# Patient Record
Sex: MALE | Race: WHITE
[De-identification: names, ages, dates, MRNs, and addresses within clinical notes are randomized per-mention and may not be internally consistent; named-entity substitution may affect disease eponyms.]

---

## 2017-01-12 ENCOUNTER — HOSPITAL ENCOUNTER (INPATIENT)
Dept: HOSPITAL 36 - ER | Age: 47
LOS: 6 days | Discharge: NURSING, REHAB OR LONG-TERM CARE | DRG: 871 | End: 2017-01-18
Attending: INTERNAL MEDICINE | Admitting: INTERNAL MEDICINE
Payer: MEDICARE

## 2017-01-12 DIAGNOSIS — N39.0: ICD-10-CM

## 2017-01-12 DIAGNOSIS — A41.9: Primary | ICD-10-CM

## 2017-01-12 DIAGNOSIS — Z88.8: ICD-10-CM

## 2017-01-12 DIAGNOSIS — F79: ICD-10-CM

## 2017-01-12 DIAGNOSIS — M81.0: ICD-10-CM

## 2017-01-12 DIAGNOSIS — Z98.890: ICD-10-CM

## 2017-01-12 DIAGNOSIS — G80.9: ICD-10-CM

## 2017-01-12 DIAGNOSIS — I10: ICD-10-CM

## 2017-01-12 DIAGNOSIS — J18.9: ICD-10-CM

## 2017-01-12 DIAGNOSIS — G40.909: ICD-10-CM

## 2017-01-12 PROCEDURE — Z7610: HCPCS

## 2017-01-12 NOTE — ED PHYSICIAN CHART
Chief Complaint/HPI





- Patient Information


Date Seen:: 01/12/17


Time Seen:: 23:30


Chief Complaint:: fever


History of Present Illness:: 





had temperature of 102.5 at 2100.  Arrived at  2 hr before from [place] 

where had extra corporeal shock wave lithotripsy and urological surgery with 

right flank incision.


Allergies:: 


 Allergies











Allergy/AdvReac Type Severity Reaction Status Date / Time


 


MDX Docusate [From Dss] Allergy   Verified 10/10/15 18:41











Historian:: EMS, Family Member


Review:: Nurse's Note Reviewed, Transfer documents Reviewed





Review of Systems





- Review of Systems


General/Constitutional: Fever


Skin: No skin lesions


Head: No headache


Eyes: No loss of vision


ENT: No earache


Neck: No neck pain


Cardio Vascular: No chest pain


Pulmonary: No SOB


GI: No nausea, No vomiting


G/U: No dysuria


Musculoskeletal: No bone or joint pain


Endocrine: No polyuria


Psychiatric: No prior psych history


Hematopoietic: No bruising


Allergic/Immuno: No urticaria


Neurological: No syncope, No focal symptoms





Past Medical History





- Past Medical History


Past Medical History: HTN (profound MR; cerebral palsy; blind; osteoporosis; 

nephrolithiasis; mitral valve regurgitation; recurrent UTI; gastritis), Seizures


Family History: Other (not available)


Social History: Care Facility


Surgical History: other (unavailable)





Family Medical History





- Family Member


  ** Mother


History Unknown: Yes





Physical Exam





- Physical Examination


Other Gen/Cons comments:: 





non-verbal; contractured


Head: Atraumatic


Eyes: Lids, conjuctiva normal, PERRL


Skin: Nl inspection


ENMT: External ears, nose nl


Neck: No JVD


Respiratory: Nl effort/Exclusion, Clear to Auscultation


Cardio Vascular: RRR


GI: No tenderness/rebounding/guarding, No organomegaly, No hernia, Normal BS's


Extremities: No edema





Labs/Radiology/EKG Results





- Lab Results


Results: 





 Laboratory Results - last 24 hr











  01/13/17 01/13/17 01/13/17





  00:04 00:04 01:00


 


WBC  12.9 H  


 


RBC  4.28 L  


 


Hgb  11.6 L  


 


Hct  34.8 L D  


 


MCV  81.3  


 


MCH  27.0  


 


MCHC Differential  33.2  


 


RDW  14.7  


 


Plt Count  400  D  


 


MPV  6.7  


 


Neutrophils %  76.7  


 


Lymphocytes %  13.8 L  


 


Monocytes %  6.1  


 


Eosinophils %  3.3  


 


Basophils %  0.1  


 


Sodium   136 


 


Potassium   3.4 L 


 


Chloride   104 


 


Carbon Dioxide   22.2 


 


Anion Gap   13.2 


 


BUN   18 


 


Creatinine   1.5 H 


 


Est GFR ( Amer)   > 60.0 


 


Est GFR (Non-Af Amer)   53.3 


 


BUN/Creatinine Ratio   12.0 


 


Glucose   141 H 


 


Calcium   9.6 


 


Urine Source    VIDES PORT


 


Urine Color    YELLOW


 


Urine Clarity    HAZY


 


Urine pH    6.0


 


Ur Specific Gravity    1.020


 


Urine Protein    >300 H


 


Urine Glucose (UA)    NEGATIVE


 


Urine Ketones    NEGATIVE


 


Urine Blood    LARGE H


 


Urine Nitrate    NEGATIVE


 


Urine Bilirubin    NEGATIVE


 


Urine Urobilinogen    0.2


 


Ur Leukocyte Esterase    SMALL H


 


Urine RBC    >100 H


 


Urine WBC    >100 H


 


Ur Epithelial Cells    FEW


 


Urine Bacteria    MODERATE














- Radiology Results


Results: 





CXR: pleural effusion right base.





ED Septic Shock





- .


Is Septic Shock (SBP<90, OR Lactate>4 mmol\L) present?: No





Reassessment (Disposition)





- Reassessment


Reassessment Condition:: Unchanged





- Diagnosis


Diagnosis:: 





sepsis; acute febrile illness; urinary tract infection





- Patient Disposition


Admitted to:: Med/Surg


Spoke to:: Ramone Elias


Admitting Medical Physician:: Ramone Elias


Condition at Disposition:: Stable, Improved

## 2017-01-13 LAB
ANION GAP SERPL CALC-SCNC: 13.2 MMOL/L (ref 7–16)
BACTERIA #/AREA URNS HPF: (no result) /HPF
BASOPHILS NFR BLD AUTO: 0.1 % (ref 0–2)
BILIRUB UR-MCNC: NEGATIVE MG/DL
BUN SERPL-MCNC: 18 MG/DL (ref 7–25)
BUN/CREAT SERPL: 12
CALCIUM SERPL-MCNC: 9.6 MG/DL (ref 8.6–10.3)
CHLORIDE SERPL-SCNC: 104 MEQ/L (ref 98–107)
CO2 SERPL-SCNC: 22.2 MEQ/L (ref 21–31)
COLOR UR: YELLOW
CREAT SERPL-MCNC: 1.5 MG/DL (ref 0.7–1.3)
EOSINOPHIL NFR BLD AUTO: 3.3 % (ref 0–5)
EPI CELLS URNS QL MICRO: (no result) /LPF
ERYTHROCYTE [DISTWIDTH] IN BLOOD BY AUTOMATED COUNT: 14.7 % (ref 11.5–20)
GLUCOSE SERPL-MCNC: 141 MG/DL (ref 70–105)
GLUCOSE UR STRIP-MCNC: NEGATIVE MG/DL
HCT VFR BLD CALC: 34.8 % (ref 39–49)
HGB BLD-MCNC: 11.6 GM/DL (ref 13.2–17.3)
KETONES UR STRIP-MCNC: NEGATIVE MG/DL
LYMPHOCYTES NFR BLD AUTO: 13.8 % (ref 20–50)
MCH RBC QN AUTO: 27 PG (ref 26–30)
MCHC RBC AUTO-ENTMCNC: 33.2 PG (ref 28–36)
MCV RBC AUTO: 81.3 FL (ref 80–99)
MONOCYTES NFR BLD AUTO: 6.1 % (ref 2–10)
NEUTROPHILS # BLD: 9.9 TH/CMM (ref 1.8–8)
NEUTROPHILS NFR BLD AUTO: 76.7 % (ref 40–80)
PH UR STRIP: 6 [PH]
PLATELET # BLD: 400 TH/CMM (ref 150–400)
PMV BLD AUTO: 6.7 FL
POTASSIUM SERPL-SCNC: 3.4 MEQ/L (ref 3.5–5.1)
PROT UR STRIP-MCNC: >300 MG/DL
RBC # BLD AUTO: 4.28 MIL/CMM (ref 4.3–5.7)
RBC # UR STRIP: (no result) /UL
RBC #/AREA URNS HPF: >100 /HPF (ref 0–5)
SODIUM SERPL-SCNC: 136 MEQ/L (ref 136–145)
UROBILINOGEN UR STRIP-ACNC: 0.2 E.U./DL (ref 0.2–1)
WBC # BLD AUTO: 12.9 TH/CMM (ref 4.8–10.8)
WBC #/AREA URNS HPF: >100 /HPF (ref 0–5)

## 2017-01-13 RX ADMIN — ZINC OXIDE SCH APPL: 200 OINTMENT TOPICAL at 09:58

## 2017-01-13 RX ADMIN — Medication SCH TAB: at 09:06

## 2017-01-13 RX ADMIN — DEXTROSE AND SODIUM CHLORIDE SCH MLS/HR: 5; .45 INJECTION, SOLUTION INTRAVENOUS at 03:00

## 2017-01-13 RX ADMIN — POLYETHYLENE GLYCOL 3350 SCH GM: 17 POWDER, FOR SOLUTION ORAL at 16:56

## 2017-01-13 RX ADMIN — ZINC OXIDE SCH APPL: 200 OINTMENT TOPICAL at 16:55

## 2017-01-13 RX ADMIN — POLYETHYLENE GLYCOL 3350 SCH GM: 17 POWDER, FOR SOLUTION ORAL at 09:08

## 2017-01-13 RX ADMIN — DEXTROSE AND SODIUM CHLORIDE SCH MLS/HR: 5; .45 INJECTION, SOLUTION INTRAVENOUS at 14:52

## 2017-01-13 NOTE — DIAGNOSTIC IMAGING REPORT
CHEST X-RAY: AP view



INDICATION: Fever



COMPARISON: Chest x-ray 10/10/2015



FINDINGS: A right pleural effusion is noted with right lung infiltrates.

Heart size is normal. Osseous structures are intact.



IMPRESSION:



Interval development of a right pleural effusion with right lung

infiltrates. Clinical correlation and follow-up is recommended.

## 2017-01-13 NOTE — ADMIT CRITERIA FORM
Admit Criteria Forms





- Admit Criteria


Diagnosis: 





                                  URINARY COMPLICATIONS





Clinical Indications for Inpatient Care 


                                                                    (Place 'X' 

for any and all applicable criteria):





Ongoing inpatient care may be indicated for urinary complications with ANY ONE 

of the following: 


[ X]I.    Urinary tract infection requiring inpatient care as indicated by ANY 

ONE of the following(8)(19)(20):


         [ ]a)  Severe symptoms (eg, high fever, severe pain)


         [ ]b)  Vomiting or dehydration requiring ongoing inpatient care


         [ X]c)  IV antibiotic needs that cannot be managed at lower level of 

care 


         [ ]d)  Hemodynamic instability


         [ ]e)  Obstruction of collecting system by stone or tumor


[ ]II.   Urinary retention requiring drainage or surgery (3)(4)(5)(17)(18)


[ ]III.  Renal failure (Use Renal Failure  Criteria  for further information.)


[ ]IV. Oliguria(30)


[ ]V.  Post obstructive diuresis requiring close monitoring of urine output and 

intravenous compensation for excessive fluid losses(33)











Extended stay beyond goal length of stay for primary condition may be needed 

until ALL of the following are present(3)(4)(5)(8):


[ ]a)   Renal function (creatinine) at baseline, or daily decreases in 

creatinine consistent with renal function return


[ ]b)   Voiding adequately or with urinary catheter or percutaneous suprapubic 

tube and management regimen in place 


         that is performable at lower level of care.


[ ]c)   Urine output adequate


[ ]d)   Fever absent or resolving


[ ]e)   Infection absent or treatable at next level of care











The original [employer] content created by [employer] has been revised. 


The portions of the content which have been revised are identified through the 

use of italic text or in bold, and [facility] 


has neither reviewed nor approved the modified material. All other unmodified 

content is copyright  [facility]


     


Please see references footnoted in the original [facility] [employer] edition 

2016


Admit Criteria Met?: Yes

## 2017-01-13 NOTE — INTERNAL MEDICINE PROG NOTE
Internal Medicine Subjective





- Subjective


Service Date: 01/13/17 ([facility] [id number])





Internal Medicine Objective





- Results


Result Diagrams: 


 01/13/17 00:04





 01/13/17 00:04


Recent Labs: 


 Laboratory Last Values











WBC  12.9 Th/cmm (4.8-10.8)  H  01/13/17  00:04    


 


RBC  4.28 Mil/cmm (4.30-5.70)  L  01/13/17  00:04    


 


Hgb  11.6 gm/dL (13.2-17.3)  L  01/13/17  00:04    


 


Hct  34.8 % (39.0-49.0)  L D 01/13/17  00:04    


 


MCV  81.3 fl (80-99)   01/13/17  00:04    


 


MCH  27.0 pg (26.0-30.0)   01/13/17  00:04    


 


MCHC Differential  33.2 pg (28.0-36.0)   01/13/17  00:04    


 


RDW  14.7 % (11.5-20.0)   01/13/17  00:04    


 


Plt Count  400 Th/cmm (150-400)  D 01/13/17  00:04    


 


MPV  6.7 fl  01/13/17  00:04    


 


Neutrophils %  76.7 % (40.0-80.0)   01/13/17  00:04    


 


Lymphocytes %  13.8 % (20.0-50.0)  L  01/13/17  00:04    


 


Monocytes %  6.1 % (2.0-10.0)   01/13/17  00:04    


 


Eosinophils %  3.3 % (0.0-5.0)   01/13/17  00:04    


 


Basophils %  0.1 % (0.0-2.0)   01/13/17  00:04    


 


Sodium  136 mEq/L (136-145)   01/13/17  00:04    


 


Potassium  3.4 mEq/L (3.5-5.1)  L  01/13/17  00:04    


 


Chloride  104 mEq/L ()   01/13/17  00:04    


 


Carbon Dioxide  22.2 mEq/L (21.0-31.0)   01/13/17  00:04    


 


Anion Gap  13.2  (7.0-16.0)   01/13/17  00:04    


 


BUN  18 mg/dL (7-25)   01/13/17  00:04    


 


Creatinine  1.5 mg/dL (0.7-1.3)  H  01/13/17  00:04    


 


Est GFR ( Amer)  > 60.0 ml/min  01/13/17  00:04    


 


Est GFR (Non-Af Amer)  53.3 ml/min  01/13/17  00:04    


 


BUN/Creatinine Ratio  12.0   01/13/17  00:04    


 


Glucose  141 mg/dL ()  H  01/13/17  00:04    


 


Calcium  9.6 mg/dL (8.6-10.3)   01/13/17  00:04    


 


Urine Source  VIDES PORT   01/13/17  01:00    


 


Urine Color  YELLOW   01/13/17  01:00    


 


Urine Clarity  HAZY  (CLEAR)   01/13/17  01:00    


 


Urine pH  6.0   01/13/17  01:00    


 


Ur Specific Gravity  1.020  (1.005-1.030)   01/13/17  01:00    


 


Urine Protein  >300 mg/dL (NEGATIVE)  H  01/13/17  01:00    


 


Urine Glucose (UA)  NEGATIVE mg/dL (NEGATIVE)   01/13/17  01:00    


 


Urine Ketones  NEGATIVE mg/dL (NEGATIVE)   01/13/17  01:00    


 


Urine Blood  LARGE  (NEGATIVE)  H  01/13/17  01:00    


 


Urine Nitrate  NEGATIVE  (NEGATIVE)   01/13/17  01:00    


 


Urine Bilirubin  NEGATIVE  (NEGATIVE)   01/13/17  01:00    


 


Urine Urobilinogen  0.2 E.U./dL (0.2 - 1.0)   01/13/17  01:00    


 


Ur Leukocyte Esterase  SMALL  (NEGATIVE)  H  01/13/17  01:00    


 


Urine RBC  >100 /hpf (0-5)  H  01/13/17  01:00    


 


Urine WBC  >100 /hpf (0-5)  H  01/13/17  01:00    


 


Ur Epithelial Cells  FEW /lpf (FEW)   01/13/17  01:00    


 


Urine Bacteria  MODERATE /hpf (NONE SEEN)   01/13/17  01:00    














- Physical Exam


Vitals and I&O: 


 Vital Signs











Temp  99.4 F   01/13/17 08:00


 


Pulse  77   01/13/17 09:07


 


Resp  18   01/13/17 08:00


 


BP  154/79   01/13/17 09:07


 


Pulse Ox  95   01/13/17 04:58








 Intake & Output











 01/12/17 01/13/17 01/13/17





 18:59 06:59 18:59


 


Intake Total  0 


 


Output Total  450 


 


Balance  -450 


 


Intake:   


 


  Oral  0 


 


Output:   


 


  Urine  450 


 


Other:   


 


  # Bowel Movements  0 











Active Medications: 


 Current Medications





Acetaminophen (Tylenol 650mg Supp)  650 mg RC Q4HR PRN


   PRN Reason: Pain or Fever >101


   Stop: 03/14/17 00:18


Acetaminophen/Hydrocodone Bitart (Norco 5mg/325mg)  1 tab PO Q4H PRN


   PRN Reason: Pain (Moderate)


   Stop: 03/14/17 00:18


Al Hydrox/Mg Hydrox/Simethicone (Maalox)  30 ml PO Q6HR PRN


   PRN Reason: Constipation


   Stop: 03/14/17 00:21


Amlodipine Besylate (Norvasc)  5 mg PO DAILY [facility]


   Stop: 03/14/17 08:59


   Last Admin: 01/13/17 09:07 Dose:  5 mg


Baclofen (Lioresal)  10 mg PO QID [facility]


   Stop: 03/14/17 08:59


   Last Admin: 01/13/17 12:12 Dose:  10 mg


Bisacodyl (Dulcolax 10 Mg Supp)  10 mg RC DAILY PRN


   PRN Reason: Constipation


   Stop: 03/14/17 00:18


Calcium Carbonate (Os-Isai)  500 mg PO TID [facility]


   Stop: 03/14/17 08:59


   Last Admin: 01/13/17 09:07 Dose:  500 mg


Famotidine (Pepcid)  20 mg PO BID [facility]


   Stop: 03/14/17 08:59


   Last Admin: 01/13/17 09:06 Dose:  20 mg


Dextrose/Sodium Chloride (D5-0.45ns)  1,000 mls @ 80 mls/hr IV .[id number] [facility]


   Stop: 03/14/17 00:29


   Last Admin: 01/13/17 03:00 Dose:  80 mls/hr


Piperacillin Sod/Tazobactam (Sod 4.5 gm/ Sodium Chloride)  100 mls @ 100 mls/hr 

IV Q8HR [facility]


   Stop: 03/14/17 01:59


   Last Admin: 01/13/17 03:25 Dose:  100 mls/hr


Magnesium Hydroxide (Milk Of Magnesia)  30 ml PO Q72H PRN


   PRN Reason: Constipation


   Stop: 03/14/17 00:18


Metoprolol Tartrate (Lopressor)  50 mg PO BID [facility]


   Stop: 03/14/17 08:59


   Last Admin: 01/13/17 09:06 Dose:  50 mg


Ondansetron HCl (Zofran)  4 mg IV Q8H PRN


   PRN Reason: Nausea / Vomiting


   Stop: 03/14/17 00:21


Petrolatum (Zinc Oxide)  1 appl TP BID [facility]


   Stop: 03/14/17 08:59


   Last Admin: 01/13/17 09:58 Dose:  1 appl


Polyethylene Glycol (Miralax)  17 gm PO BID [facility]


   Stop: 03/14/17 08:59


   Last Admin: 01/13/17 09:08 Dose:  17 gm


Vitamin D (Vitamin D)  400 iu PO DAILY [facility]


   Stop: 03/14/17 08:59


   Last Admin: 01/13/17 09:06 Dose:  400 iu











- Procedures


Procedures: 


 Procedures











Procedure Code Date


 


INSERT INDWELLING CATH 57.94 10/16/08


 


INSERT TEMP BLADDER CATH [id number] 10/16/08














Internal Medicine Assmt/Plan





- Assessment


Assessment: 





Fever, Sepsis

## 2017-01-14 RX ADMIN — DEXTROSE AND SODIUM CHLORIDE SCH MLS/HR: 5; .45 INJECTION, SOLUTION INTRAVENOUS at 06:40

## 2017-01-14 RX ADMIN — DEXTROSE AND SODIUM CHLORIDE SCH MLS/HR: 5; .45 INJECTION, SOLUTION INTRAVENOUS at 20:12

## 2017-01-14 RX ADMIN — POLYETHYLENE GLYCOL 3350 SCH GM: 17 POWDER, FOR SOLUTION ORAL at 08:49

## 2017-01-14 RX ADMIN — DEXTROMETHORPHAN HBR AND GUAIFENESIN PRN ML: 10; 100 SOLUTION ORAL at 19:56

## 2017-01-14 RX ADMIN — Medication SCH TAB: at 08:49

## 2017-01-14 RX ADMIN — ZINC OXIDE SCH APPL: 200 OINTMENT TOPICAL at 16:24

## 2017-01-14 RX ADMIN — ZINC OXIDE SCH APPL: 200 OINTMENT TOPICAL at 08:51

## 2017-01-14 RX ADMIN — POLYETHYLENE GLYCOL 3350 SCH GM: 17 POWDER, FOR SOLUTION ORAL at 16:24

## 2017-01-14 NOTE — INTERNAL MEDICINE PROG NOTE
Internal Medicine Subjective





- Subjective


Service Date: 01/14/17


Patient seen and examined:: with staff


Patient is:: awake


Per staff patient is:: no adverse event





Internal Medicine Objective





- Results


Result Diagrams: 


 01/13/17 00:04





 01/13/17 00:04


Recent Labs: 


 Laboratory Last Values











WBC  12.9 Th/cmm (4.8-10.8)  H  01/13/17  00:04    


 


RBC  4.28 Mil/cmm (4.30-5.70)  L  01/13/17  00:04    


 


Hgb  11.6 gm/dL (13.2-17.3)  L  01/13/17  00:04    


 


Hct  34.8 % (39.0-49.0)  L D 01/13/17  00:04    


 


MCV  81.3 fl (80-99)   01/13/17  00:04    


 


MCH  27.0 pg (26.0-30.0)   01/13/17  00:04    


 


MCHC Differential  33.2 pg (28.0-36.0)   01/13/17  00:04    


 


RDW  14.7 % (11.5-20.0)   01/13/17  00:04    


 


Plt Count  400 Th/cmm (150-400)  D 01/13/17  00:04    


 


MPV  6.7 fl  01/13/17  00:04    


 


Neutrophils %  76.7 % (40.0-80.0)   01/13/17  00:04    


 


Lymphocytes %  13.8 % (20.0-50.0)  L  01/13/17  00:04    


 


Monocytes %  6.1 % (2.0-10.0)   01/13/17  00:04    


 


Eosinophils %  3.3 % (0.0-5.0)   01/13/17  00:04    


 


Basophils %  0.1 % (0.0-2.0)   01/13/17  00:04    


 


Sodium  136 mEq/L (136-145)   01/13/17  00:04    


 


Potassium  3.4 mEq/L (3.5-5.1)  L  01/13/17  00:04    


 


Chloride  104 mEq/L ()   01/13/17  00:04    


 


Carbon Dioxide  22.2 mEq/L (21.0-31.0)   01/13/17  00:04    


 


Anion Gap  13.2  (7.0-16.0)   01/13/17  00:04    


 


BUN  18 mg/dL (7-25)   01/13/17  00:04    


 


Creatinine  1.5 mg/dL (0.7-1.3)  H  01/13/17  00:04    


 


Est GFR ( Amer)  > 60.0 ml/min  01/13/17  00:04    


 


Est GFR (Non-Af Amer)  53.3 ml/min  01/13/17  00:04    


 


BUN/Creatinine Ratio  12.0   01/13/17  00:04    


 


Glucose  141 mg/dL ()  H  01/13/17  00:04    


 


Calcium  9.6 mg/dL (8.6-10.3)   01/13/17  00:04    


 


Urine Source  VIDES PORT   01/13/17  01:00    


 


Urine Color  YELLOW   01/13/17  01:00    


 


Urine Clarity  HAZY  (CLEAR)   01/13/17  01:00    


 


Urine pH  6.0   01/13/17  01:00    


 


Ur Specific Gravity  1.020  (1.005-1.030)   01/13/17  01:00    


 


Urine Protein  >300 mg/dL (NEGATIVE)  H  01/13/17  01:00    


 


Urine Glucose (UA)  NEGATIVE mg/dL (NEGATIVE)   01/13/17  01:00    


 


Urine Ketones  NEGATIVE mg/dL (NEGATIVE)   01/13/17  01:00    


 


Urine Blood  LARGE  (NEGATIVE)  H  01/13/17  01:00    


 


Urine Nitrate  NEGATIVE  (NEGATIVE)   01/13/17  01:00    


 


Urine Bilirubin  NEGATIVE  (NEGATIVE)   01/13/17  01:00    


 


Urine Urobilinogen  0.2 E.U./dL (0.2 - 1.0)   01/13/17  01:00    


 


Ur Leukocyte Esterase  SMALL  (NEGATIVE)  H  01/13/17  01:00    


 


Urine RBC  >100 /hpf (0-5)  H  01/13/17  01:00    


 


Urine WBC  >100 /hpf (0-5)  H  01/13/17  01:00    


 


Ur Epithelial Cells  FEW /lpf (FEW)   01/13/17  01:00    


 


Urine Bacteria  MODERATE /hpf (NONE SEEN)   01/13/17  01:00    














- Physical Exam


Vitals and I&O: 


 Vital Signs











Temp  99.1 F   01/14/17 16:00


 


Pulse  99   01/14/17 16:25


 


Resp  18   01/14/17 16:00


 


BP  128/76   01/14/17 16:25


 


Pulse Ox  94   01/14/17 16:00








 Intake & Output











 01/13/17 01/14/17 01/14/17





 18:59 06:59 18:59


 


Intake Total 8216.948 9274.667 


 


Output Total  1201 


 


Balance 1049.333 10.667 


 


Intake:   


 


  Intake, IV Amount 1760.746 4002.667 


 


    D5-0.45NS 1,000 ml @ 80 624.794 4175 





    mls/hr IV .[id number] [facility] Rx   





    #:[id number]   


 


    Piperacillin Sodium/ 100 191.667 





    Tazobact 4.5 gm In Sodium   





    Chloride 0.9% 100 ml @   





    100 mls/hr IV Q8HR [facility] Rx   





    #:[id number]   


 


  Oral  20 


 


Output:   


 


  Urine  1200 


 


  Stool  1 











Active Medications: 


 Current Medications





Acetaminophen (Tylenol 650mg Supp)  650 mg RC Q4HR PRN


   PRN Reason: Pain or Fever >101


   Stop: 03/14/17 00:18


   Last Admin: 01/14/17 08:49 Dose:  650 mg


Acetaminophen/Hydrocodone Bitart (Norco 5mg/325mg)  1 tab PO Q4H PRN


   PRN Reason: Pain (Moderate)


   Stop: 03/14/17 00:18


Al Hydrox/Mg Hydrox/Simethicone (Maalox)  30 ml PO Q6HR PRN


   PRN Reason: Constipation


   Stop: 03/14/17 00:21


Amlodipine Besylate (Norvasc)  5 mg PO DAILY [facility]


   Stop: 03/14/17 08:59


   Last Admin: 01/14/17 08:50 Dose:  5 mg


Baclofen (Lioresal)  10 mg PO QID [facility]


   Stop: 03/14/17 08:59


   Last Admin: 01/14/17 16:25 Dose:  10 mg


Bisacodyl (Dulcolax 10 Mg Supp)  10 mg RC DAILY PRN


   PRN Reason: Constipation


   Stop: 03/14/17 00:18


Calcium Carbonate (Os-Isai)  500 mg PO TID [facility]


   Stop: 03/14/17 08:59


   Last Admin: 01/14/17 13:48 Dose:  500 mg


Famotidine (Pepcid)  20 mg PO BID [facility]


   Stop: 03/14/17 08:59


   Last Admin: 01/14/17 16:25 Dose:  20 mg


Dextrose/Sodium Chloride (D5-0.45ns)  1,000 mls @ 80 mls/hr IV .[id number] [facility]


   Stop: 03/14/17 00:29


   Last Admin: 01/14/17 06:40 Dose:  80 mls/hr


Piperacillin Sod/Tazobactam (Sod 4.5 gm/ Sodium Chloride)  100 mls @ 100 mls/hr 

IV Q8HR [facility]


   Stop: 03/14/17 01:59


   Last Infusion: 01/14/17 06:18 Dose:  0 mls/hr


Magnesium Hydroxide (Milk Of Magnesia)  30 ml PO Q72H PRN


   PRN Reason: Constipation


   Stop: 03/14/17 00:18


Metoprolol Tartrate (Lopressor)  50 mg PO BID [facility]


   Stop: 03/14/17 08:59


   Last Admin: 01/14/17 16:25 Dose:  50 mg


Miscellaneous ([employer] Prophylaxis Screen/ Admission)  1 ea [facility] PRN PRN


   PRN Reason: PROTOCOL


   Stop: 03/14/17 13:08


Ondansetron HCl (Zofran)  4 mg IV Q8H PRN


   PRN Reason: Nausea / Vomiting


   Stop: 03/14/17 00:21


Petrolatum (Zinc Oxide)  1 appl TP BID [facility]


   Stop: 03/14/17 08:59


   Last Admin: 01/14/17 16:24 Dose:  1 appl


Polyethylene Glycol (Miralax)  17 gm PO BID [facility]


   Stop: 03/14/17 08:59


   Last Admin: 01/14/17 16:24 Dose:  17 gm


Vitamin D (Vitamin D)  400 iu PO DAILY [facility]


   Stop: 03/14/17 08:59


   Last Admin: 01/14/17 08:49 Dose:  400 iu








General: alert


HEENT: NC/AT, PERRLA


Neck: Supple


Lungs: CTAB


Cardiovascular: RRR, Normal S1, Normal S2, without murmur


Abdomen: soft non-tender





- Procedures


Procedures: 


 Procedures











Procedure Code Date


 


INSERT INDWELLING CATH 57.94 10/16/08


 


INSERT TEMP BLADDER CATH [id number] 10/16/08














Internal Medicine Assmt/Plan





- Assessment


Assessment: 





Fever


Sepsis





- Plan


Plan: 





MONITOR FOR FEVER


IVABX


CBC/BMP IN AM


IVF FOR HYDRATION

## 2017-01-15 LAB
ANION GAP SERPL CALC-SCNC: 10.1 MMOL/L (ref 7–16)
BASOPHILS NFR BLD AUTO: 0.8 % (ref 0–2)
BUN SERPL-MCNC: 12 MG/DL (ref 7–25)
BUN/CREAT SERPL: 7.5
CALCIUM SERPL-MCNC: 9.1 MG/DL (ref 8.6–10.3)
CHLORIDE SERPL-SCNC: 108 MEQ/L (ref 98–107)
CO2 SERPL-SCNC: 20.6 MEQ/L (ref 21–31)
CREAT SERPL-MCNC: 1.6 MG/DL (ref 0.7–1.3)
EOSINOPHIL NFR BLD AUTO: 5.6 % (ref 0–5)
ERYTHROCYTE [DISTWIDTH] IN BLOOD BY AUTOMATED COUNT: 15 % (ref 11.5–20)
GLUCOSE SERPL-MCNC: 107 MG/DL (ref 70–105)
HCT VFR BLD CALC: 29.5 % (ref 39–49)
HGB BLD-MCNC: 9.8 GM/DL (ref 13.2–17.3)
LYMPHOCYTES NFR BLD AUTO: 13 % (ref 20–50)
MCH RBC QN AUTO: 26.9 PG (ref 26–30)
MCHC RBC AUTO-ENTMCNC: 33.2 PG (ref 28–36)
MCV RBC AUTO: 81.2 FL (ref 80–99)
MONOCYTES NFR BLD AUTO: 7.5 % (ref 2–10)
NEUTROPHILS # BLD: 7.1 TH/CMM (ref 1.8–8)
NEUTROPHILS NFR BLD AUTO: 73.1 % (ref 40–80)
PLATELET # BLD: 420 TH/CMM (ref 150–400)
PMV BLD AUTO: 6.8 FL
POTASSIUM SERPL-SCNC: 3.7 MEQ/L (ref 3.5–5.1)
RBC # BLD AUTO: 3.63 MIL/CMM (ref 4.3–5.7)
SODIUM SERPL-SCNC: 135 MEQ/L (ref 136–145)
WBC # BLD AUTO: 9.6 TH/CMM (ref 4.8–10.8)

## 2017-01-15 RX ADMIN — POLYETHYLENE GLYCOL 3350 SCH GM: 17 POWDER, FOR SOLUTION ORAL at 16:32

## 2017-01-15 RX ADMIN — POLYETHYLENE GLYCOL 3350 SCH GM: 17 POWDER, FOR SOLUTION ORAL at 08:50

## 2017-01-15 RX ADMIN — Medication SCH TAB: at 08:51

## 2017-01-15 RX ADMIN — ZINC OXIDE SCH APPL: 200 OINTMENT TOPICAL at 08:50

## 2017-01-15 RX ADMIN — DEXTROSE AND SODIUM CHLORIDE SCH MLS/HR: 5; .45 INJECTION, SOLUTION INTRAVENOUS at 16:33

## 2017-01-15 RX ADMIN — ZINC OXIDE SCH APPL: 200 OINTMENT TOPICAL at 16:32

## 2017-01-15 RX ADMIN — DEXTROMETHORPHAN HBR AND GUAIFENESIN PRN ML: 10; 100 SOLUTION ORAL at 09:11

## 2017-01-15 NOTE — INTERNAL MEDICINE PROG NOTE
Internal Medicine Subjective





- Subjective


Service Date: 01/15/17


Patient seen and examined:: with staff


Patient is:: awake


Per staff patient is:: no adverse event





Internal Medicine Objective





- Results


Result Diagrams: 


 01/15/17 05:20





 01/15/17 05:20


Recent Labs: 


 Laboratory Last Values











WBC  9.6 Th/cmm (4.8-10.8)  D 01/15/17  05:20    


 


RBC  3.63 Mil/cmm (4.30-5.70)  L  01/15/17  05:20    


 


Hgb  9.8 gm/dL (13.2-17.3)  L  01/15/17  05:20    


 


Hct  29.5 % (39.0-49.0)  L D 01/15/17  05:20    


 


MCV  81.2 fl (80-99)   01/15/17  05:20    


 


MCH  26.9 pg (26.0-30.0)   01/15/17  05:20    


 


MCHC Differential  33.2 pg (28.0-36.0)   01/15/17  05:20    


 


RDW  15.0 % (11.5-20.0)   01/15/17  05:20    


 


Plt Count  420 Th/cmm (150-400)  H  01/15/17  05:20    


 


MPV  6.8 fl  01/15/17  05:20    


 


Neutrophils %  73.1 % (40.0-80.0)   01/15/17  05:20    


 


Lymphocytes %  13.0 % (20.0-50.0)  L  01/15/17  05:20    


 


Monocytes %  7.5 % (2.0-10.0)   01/15/17  05:20    


 


Eosinophils %  5.6 % (0.0-5.0)  H  01/15/17  05:20    


 


Basophils %  0.8 % (0.0-2.0)   01/15/17  05:20    


 


Sodium  135 mEq/L (136-145)  L  01/15/17  05:20    


 


Potassium  3.7 mEq/L (3.5-5.1)   01/15/17  05:20    


 


Chloride  108 mEq/L ()  H  01/15/17  05:20    


 


Carbon Dioxide  20.6 mEq/L (21.0-31.0)  L  01/15/17  05:20    


 


Anion Gap  10.1  (7.0-16.0)   01/15/17  05:20    


 


BUN  12 mg/dL (7-25)   01/15/17  05:20    


 


Creatinine  1.6 mg/dL (0.7-1.3)  H  01/15/17  05:20    


 


Est GFR ( Amer)  59.9 ml/min  01/15/17  05:20    


 


Est GFR (Non-Af Amer)  49.5 ml/min  01/15/17  05:20    


 


BUN/Creatinine Ratio  7.5   01/15/17  05:20    


 


Glucose  107 mg/dL ()  H  01/15/17  05:20    


 


Calcium  9.1 mg/dL (8.6-10.3)   01/15/17  05:20    


 


Urine Source  VIDES PORT   01/13/17  01:00    


 


Urine Color  YELLOW   01/13/17  01:00    


 


Urine Clarity  HAZY  (CLEAR)   01/13/17  01:00    


 


Urine pH  6.0   01/13/17  01:00    


 


Ur Specific Gravity  1.020  (1.005-1.030)   01/13/17  01:00    


 


Urine Protein  >300 mg/dL (NEGATIVE)  H  01/13/17  01:00    


 


Urine Glucose (UA)  NEGATIVE mg/dL (NEGATIVE)   01/13/17  01:00    


 


Urine Ketones  NEGATIVE mg/dL (NEGATIVE)   01/13/17  01:00    


 


Urine Blood  LARGE  (NEGATIVE)  H  01/13/17  01:00    


 


Urine Nitrate  NEGATIVE  (NEGATIVE)   01/13/17  01:00    


 


Urine Bilirubin  NEGATIVE  (NEGATIVE)   01/13/17  01:00    


 


Urine Urobilinogen  0.2 E.U./dL (0.2 - 1.0)   01/13/17  01:00    


 


Ur Leukocyte Esterase  SMALL  (NEGATIVE)  H  01/13/17  01:00    


 


Urine RBC  >100 /hpf (0-5)  H  01/13/17  01:00    


 


Urine WBC  >100 /hpf (0-5)  H  01/13/17  01:00    


 


Ur Epithelial Cells  FEW /lpf (FEW)   01/13/17  01:00    


 


Urine Bacteria  MODERATE /hpf (NONE SEEN)   01/13/17  01:00    














- Physical Exam


Vitals and I&O: 


 Vital Signs











Temp  98.5 F   01/15/17 12:00


 


Pulse  100   01/15/17 16:31


 


Resp  18   01/15/17 12:00


 


BP  115/78   01/15/17 16:31


 


Pulse Ox  94   01/15/17 12:00








 Intake & Output











 01/14/17 01/15/17 01/15/17





 18:59 06:59 18:59


 


Intake Total 200 1100 1000


 


Output Total 300  


 


Balance -100 1100 1000


 


Intake:   


 


  Intake, IV Amount  1100 1000


 


    D5-0.45NS 1,000 ml @ 80  1000 1000





    mls/hr IV .[id number] [facility]   





    #:[id number]   


 


    Piperacillin Sodium/  100 





    Tazobact 4.5 gm In Sodium   





    Chloride 0.9% 100 ml @   





    100 mls/hr IV Q8HR [facility]   





    #:[id number]   


 


  Oral 200  


 


Output:   


 


  Urine 300  


 


Other:   


 


  # Voids 2  


 


  # Bowel Movements 2  











Active Medications: 


 Current Medications





Acetaminophen (Tylenol 650mg Supp)  650 mg RC Q4HR PRN


   PRN Reason: Pain or Fever >101


   Stop: 03/14/17 00:18


   Last Admin: 01/15/17 09:11 Dose:  650 mg


Acetaminophen/Hydrocodone Bitart (Norco 5mg/325mg)  1 tab PO Q4H PRN


   PRN Reason: Pain (Moderate)


   Stop: 03/14/17 00:18


   Last Admin: 01/14/17 19:56 Dose:  1 tab


Al Hydrox/Mg Hydrox/Simethicone (Maalox)  30 ml PO Q6HR PRN


   PRN Reason: Constipation


   Stop: 03/14/17 00:21


Amlodipine Besylate (Norvasc)  5 mg PO DAILY [facility]


   Stop: 03/14/17 08:59


   Last Admin: 01/15/17 08:50 Dose:  5 mg


Baclofen (Lioresal)  10 mg PO QID [facility]


   Stop: 03/14/17 08:59


   Last Admin: 01/15/17 16:32 Dose:  10 mg


Bisacodyl (Dulcolax 10 Mg Supp)  10 mg RC DAILY PRN


   PRN Reason: Constipation


   Stop: 03/14/17 00:18


Calcium Carbonate (Os-Isai)  500 mg PO TID [facility]


   Stop: 03/14/17 08:59


   Last Admin: 01/15/17 13:31 Dose:  500 mg


Famotidine (Pepcid)  20 mg PO BID [facility]


   Stop: 03/14/17 08:59


   Last Admin: 01/15/17 16:32 Dose:  20 mg


Guaifenesin/Dextromethorphan (Robitussin Dm)  10 ml PO Q8HR PRN


   PRN Reason: Cough


   Stop: 03/15/17 17:28


   Last Admin: 01/15/17 09:11 Dose:  10 ml


Dextrose/Sodium Chloride (D5-0.45ns)  1,000 mls @ 80 mls/hr IV .[id number] [facility]


   Stop: 03/14/17 00:29


   Last Admin: 01/15/17 16:33 Dose:  80 mls/hr


Piperacillin Sod/Tazobactam (Sod 4.5 gm/ Sodium Chloride)  100 mls @ 100 mls/hr 

IV Q8HR [facility]


   Stop: 03/14/17 01:59


   Last Infusion: 01/15/17 02:53 Dose:  0 mls/hr


Magnesium Hydroxide (Milk Of Magnesia)  30 ml PO Q72H PRN


   PRN Reason: Constipation


   Stop: 03/14/17 00:18


Metoprolol Tartrate (Lopressor)  50 mg PO BID [facility]


   Stop: 03/14/17 08:59


   Last Admin: 01/15/17 16:31 Dose:  50 mg


Miscellaneous (Vte Chemical Prophylaxis Screen/ Admission)  1 ea [facility] PRN PRN


   PRN Reason: PROTOCOL


   Stop: 03/14/17 13:08


Ondansetron HCl (Zofran)  4 mg IV Q8H PRN


   PRN Reason: Nausea / Vomiting


   Stop: 03/14/17 00:21


Petrolatum (Zinc Oxide)  1 appl TP BID [facility]


   Stop: 03/14/17 08:59


   Last Admin: 01/15/17 16:32 Dose:  1 appl


Polyethylene Glycol (Miralax)  17 gm PO BID [facility]


   Stop: 03/14/17 08:59


   Last Admin: 01/15/17 16:32 Dose:  17 gm


Vitamin D (Vitamin D)  400 iu PO DAILY [facility]


   Stop: 03/14/17 08:59


   Last Admin: 01/15/17 08:51 Dose:  400 iu








General: alert


HEENT: NC/AT, PERRLA


Neck: Supple


Lungs: CTAB


Cardiovascular: RRR, Normal S1, Normal S2, without murmur


Abdomen: soft non-tender





- Procedures


Procedures: 


 Procedures











Procedure Code Date


 


INSERT INDWELLING CATH 57.94 10/16/08


 


INSERT TEMP BLADDER CATH 02403 10/16/08














Internal Medicine Assmt/Plan





- Assessment


Assessment: 





Fever


Sepsis





- Plan


Plan: 





MONITOR FOR FEVER


IVABX


DC PLANNING IN AM


CBC.BMP IN AM 


IVF FOR HYDRATION

## 2017-01-16 LAB
ANION GAP SERPL CALC-SCNC: 9.3 MMOL/L (ref 7–16)
BASOPHILS NFR BLD AUTO: 1.1 % (ref 0–2)
BUN SERPL-MCNC: 14 MG/DL (ref 7–25)
BUN/CREAT SERPL: 8.8
CALCIUM SERPL-MCNC: 9.1 MG/DL (ref 8.6–10.3)
CHLORIDE SERPL-SCNC: 113 MEQ/L (ref 98–107)
CO2 SERPL-SCNC: 21.3 MEQ/L (ref 21–31)
CREAT SERPL-MCNC: 1.6 MG/DL (ref 0.7–1.3)
EOSINOPHIL NFR BLD AUTO: 5.3 % (ref 0–5)
ERYTHROCYTE [DISTWIDTH] IN BLOOD BY AUTOMATED COUNT: 15.5 % (ref 11.5–20)
GLUCOSE SERPL-MCNC: 107 MG/DL (ref 70–105)
HCT VFR BLD CALC: 28.6 % (ref 39–49)
HGB BLD-MCNC: 9.6 GM/DL (ref 13.2–17.3)
LYMPHOCYTES NFR BLD AUTO: 16.5 % (ref 20–50)
MCH RBC QN AUTO: 27.1 PG (ref 26–30)
MCHC RBC AUTO-ENTMCNC: 33.7 PG (ref 28–36)
MCV RBC AUTO: 80.4 FL (ref 80–99)
MONOCYTES NFR BLD AUTO: 6.2 % (ref 2–10)
NEUTROPHILS # BLD: 6.1 TH/CMM (ref 1.8–8)
NEUTROPHILS NFR BLD AUTO: 70.9 % (ref 40–80)
PLATELET # BLD: 479 TH/CMM (ref 150–400)
PMV BLD AUTO: 6.9 FL
POTASSIUM SERPL-SCNC: 3.6 MEQ/L (ref 3.5–5.1)
RBC # BLD AUTO: 3.56 MIL/CMM (ref 4.3–5.7)
SODIUM SERPL-SCNC: 140 MEQ/L (ref 136–145)
WBC # BLD AUTO: 8.6 TH/CMM (ref 4.8–10.8)

## 2017-01-16 RX ADMIN — Medication SCH TAB: at 09:42

## 2017-01-16 RX ADMIN — POLYETHYLENE GLYCOL 3350 SCH GM: 17 POWDER, FOR SOLUTION ORAL at 09:43

## 2017-01-16 RX ADMIN — POLYETHYLENE GLYCOL 3350 SCH GM: 17 POWDER, FOR SOLUTION ORAL at 16:44

## 2017-01-16 RX ADMIN — ZINC OXIDE SCH APPL: 200 OINTMENT TOPICAL at 09:52

## 2017-01-16 NOTE — INTERNAL MEDICINE PROG NOTE
Internal Medicine Subjective





- Subjective


Service Date: 01/16/17


Patient seen and examined:: with staff


Patient is:: awake


Per staff patient is:: no adverse event





Internal Medicine Objective





- Results


Result Diagrams: 


 01/16/17 06:00





 01/16/17 06:00


Recent Labs: 


 Laboratory Last Values











WBC  8.6 Th/cmm (4.8-10.8)   01/16/17  06:00    


 


RBC  3.56 Mil/cmm (4.30-5.70)  L  01/16/17  06:00    


 


Hgb  9.6 gm/dL (13.2-17.3)  L  01/16/17  06:00    


 


Hct  28.6 % (39.0-49.0)  L  01/16/17  06:00    


 


MCV  80.4 fl (80-99)   01/16/17  06:00    


 


MCH  27.1 pg (26.0-30.0)   01/16/17  06:00    


 


MCHC Differential  33.7 pg (28.0-36.0)   01/16/17  06:00    


 


RDW  15.5 % (11.5-20.0)   01/16/17  06:00    


 


Plt Count  479 Th/cmm (150-400)  H  01/16/17  06:00    


 


MPV  6.9 fl  01/16/17  06:00    


 


Neutrophils %  70.9 % (40.0-80.0)   01/16/17  06:00    


 


Lymphocytes %  16.5 % (20.0-50.0)  L  01/16/17  06:00    


 


Monocytes %  6.2 % (2.0-10.0)   01/16/17  06:00    


 


Eosinophils %  5.3 % (0.0-5.0)  H  01/16/17  06:00    


 


Basophils %  1.1 % (0.0-2.0)   01/16/17  06:00    


 


Sodium  140 mEq/L (136-145)   01/16/17  06:00    


 


Potassium  3.6 mEq/L (3.5-5.1)   01/16/17  06:00    


 


Chloride  113 mEq/L ()  H  01/16/17  06:00    


 


Carbon Dioxide  21.3 mEq/L (21.0-31.0)   01/16/17  06:00    


 


Anion Gap  9.3  (7.0-16.0)   01/16/17  06:00    


 


BUN  14 mg/dL (7-25)   01/16/17  06:00    


 


Creatinine  1.6 mg/dL (0.7-1.3)  H  01/16/17  06:00    


 


Est GFR ( Amer)  59.9 ml/min  01/16/17  06:00    


 


Est GFR (Non-Af Amer)  49.5 ml/min  01/16/17  06:00    


 


BUN/Creatinine Ratio  8.8   01/16/17  06:00    


 


Glucose  107 mg/dL ()  H  01/16/17  06:00    


 


Calcium  9.1 mg/dL (8.6-10.3)   01/16/17  06:00    


 


Urine Source  VIDES PORT   01/13/17  01:00    


 


Urine Color  YELLOW   01/13/17  01:00    


 


Urine Clarity  HAZY  (CLEAR)   01/13/17  01:00    


 


Urine pH  6.0   01/13/17  01:00    


 


Ur Specific Gravity  1.020  (1.005-1.030)   01/13/17  01:00    


 


Urine Protein  >300 mg/dL (NEGATIVE)  H  01/13/17  01:00    


 


Urine Glucose (UA)  NEGATIVE mg/dL (NEGATIVE)   01/13/17  01:00    


 


Urine Ketones  NEGATIVE mg/dL (NEGATIVE)   01/13/17  01:00    


 


Urine Blood  LARGE  (NEGATIVE)  H  01/13/17  01:00    


 


Urine Nitrate  NEGATIVE  (NEGATIVE)   01/13/17  01:00    


 


Urine Bilirubin  NEGATIVE  (NEGATIVE)   01/13/17  01:00    


 


Urine Urobilinogen  0.2 E.U./dL (0.2 - 1.0)   01/13/17  01:00    


 


Ur Leukocyte Esterase  SMALL  (NEGATIVE)  H  01/13/17  01:00    


 


Urine RBC  >100 /hpf (0-5)  H  01/13/17  01:00    


 


Urine WBC  >100 /hpf (0-5)  H  01/13/17  01:00    


 


Ur Epithelial Cells  FEW /lpf (FEW)   01/13/17  01:00    


 


Urine Bacteria  MODERATE /hpf (NONE SEEN)   01/13/17  01:00    














- Physical Exam


Vitals and I&O: 


 Vital Signs











Temp  98.8 F   01/16/17 04:00


 


Pulse  61   01/16/17 09:43


 


Resp  19   01/16/17 04:00


 


BP  106/71   01/16/17 09:43


 


Pulse Ox  95   01/16/17 04:00








 Intake & Output











 01/15/17 01/16/17 01/16/17





 18:59 06:59 18:59


 


Intake Total 1000 80 


 


Output Total  900 


 


Balance 1000 -820 


 


Intake:   


 


  Intake, IV Amount 1000  


 


    D5-0.45NS 1,000 ml @ 80 1000  





    mls/hr IV .[id number] [facility]   





    #:[id number]   


 


  Oral  80 


 


Output:   


 


  Urine  900 


 


Other:   


 


  # Bowel Movements  1 


 


  Stool Characteristics  Soft 





  Formed 











Active Medications: 


 Current Medications





Acetaminophen (Tylenol 650mg Supp)  650 mg RC Q4HR PRN


   PRN Reason: Pain or Fever >101


   Stop: 03/14/17 00:18


   Last Admin: 01/15/17 09:11 Dose:  650 mg


Acetaminophen/Hydrocodone Bitart (Norco 5mg/325mg)  1 tab PO Q4H PRN


   PRN Reason: Pain (Moderate)


   Stop: 03/14/17 00:18


   Last Admin: 01/14/17 19:56 Dose:  1 tab


Al Hydrox/Mg Hydrox/Simethicone (Maalox)  30 ml PO Q6HR PRN


   PRN Reason: Constipation


   Stop: 03/14/17 00:21


Amlodipine Besylate (Norvasc)  5 mg PO DAILY [facility]


   Stop: 03/14/17 08:59


   Last Admin: 01/16/17 09:43 Dose:  5 mg


Baclofen (Lioresal)  10 mg PO QID [facility]


   Stop: 03/14/17 08:59


   Last Admin: 01/16/17 09:43 Dose:  10 mg


Bisacodyl (Dulcolax 10 Mg Supp)  10 mg RC DAILY PRN


   PRN Reason: Constipation


   Stop: 03/14/17 00:18


Calcium Carbonate (Os-Isai)  500 mg PO TID [facility]


   Stop: 03/14/17 08:59


   Last Admin: 01/16/17 09:43 Dose:  500 mg


Famotidine (Pepcid)  20 mg PO BID [facility]


   Stop: 03/14/17 08:59


   Last Admin: 01/16/17 09:43 Dose:  20 mg


Guaifenesin/Dextromethorphan (Robitussin Dm)  10 ml PO Q8HR PRN


   PRN Reason: Cough


   Stop: 03/15/17 17:28


   Last Admin: 01/15/17 09:11 Dose:  10 ml


Dextrose/Sodium Chloride (D5-0.45ns)  1,000 mls @ 80 mls/hr IV .[id number] [facility]


   Stop: 03/14/17 00:29


   Last Admin: 01/15/17 16:33 Dose:  80 mls/hr


Piperacillin Sod/Tazobactam (Sod 4.5 gm/ Sodium Chloride)  100 mls @ 100 mls/hr 

IV Q8HR [facility]


   Stop: 03/14/17 01:59


   Last Admin: 01/16/17 12:04 Dose:  100 mls/hr


Magnesium Hydroxide (Milk Of Magnesia)  30 ml PO Q72H PRN


   PRN Reason: Constipation


   Stop: 03/14/17 00:18


Metoprolol Tartrate (Lopressor)  50 mg PO BID [facility]


   Stop: 03/14/17 08:59


   Last Admin: 01/16/17 09:43 Dose:  50 mg


Miscellaneous (Vte Chemical Prophylaxis Screen/ Admission)  1 ea [facility] PRN PRN


   PRN Reason: PROTOCOL


   Stop: 03/14/17 13:08


Ondansetron HCl (Zofran)  4 mg IV Q8H PRN


   PRN Reason: Nausea / Vomiting


   Stop: 03/14/17 00:21


Petrolatum (Zinc Oxide)  1 appl TP BID [facility]


   Stop: 03/14/17 08:59


   Last Admin: 01/16/17 09:52 Dose:  1 appl


Polyethylene Glycol (Miralax)  17 gm PO BID [facility]


   Stop: 03/14/17 08:59


   Last Admin: 01/16/17 09:43 Dose:  17 gm


Vitamin D (Vitamin D)  400 iu PO DAILY [facility]


   Stop: 03/14/17 08:59


   Last Admin: 01/16/17 09:42 Dose:  400 iu








General: alert


HEENT: NC/AT, PERRLA


Neck: Supple


Cardiovascular: RRR, Normal S1, Normal S2, without murmur


Abdomen: soft non-tender, non-distended, positive bowel sound





- Procedures


Procedures: 


 Procedures











Procedure Code Date


 


INSERT INDWELLING CATH 57.94 10/16/08


 


INSERT TEMP BLADDER CATH [id number] 10/16/08














Internal Medicine Assmt/Plan





- Assessment


Assessment: 





Fever


Sepsis





- Plan


Plan: 


LTAC EVAL 


MONITOR FOR FEVER


IVABX


CBC/BMP IN AM 


IVF FOR HYDRATION

## 2017-01-17 LAB
ANION GAP SERPL CALC-SCNC: 16.8 MMOL/L (ref 7–16)
BASOPHILS NFR BLD AUTO: 0.9 % (ref 0–2)
BUN SERPL-MCNC: 12 MG/DL (ref 7–25)
BUN/CREAT SERPL: 8.6
CALCIUM SERPL-MCNC: 9.4 MG/DL (ref 8.6–10.3)
CHLORIDE SERPL-SCNC: 112 MEQ/L (ref 98–107)
CO2 SERPL-SCNC: 16.5 MEQ/L (ref 21–31)
CREAT SERPL-MCNC: 1.4 MG/DL (ref 0.7–1.3)
EOSINOPHIL NFR BLD AUTO: 5.8 % (ref 0–5)
ERYTHROCYTE [DISTWIDTH] IN BLOOD BY AUTOMATED COUNT: 15.6 % (ref 11.5–20)
GLUCOSE SERPL-MCNC: 112 MG/DL (ref 70–105)
HCT VFR BLD CALC: 30 % (ref 39–49)
HGB BLD-MCNC: 9.8 GM/DL (ref 13.2–17.3)
LYMPHOCYTES NFR BLD AUTO: 16.8 % (ref 20–50)
MCH RBC QN AUTO: 26.1 PG (ref 26–30)
MCHC RBC AUTO-ENTMCNC: 32.5 PG (ref 28–36)
MCV RBC AUTO: 80.2 FL (ref 80–99)
MONOCYTES NFR BLD AUTO: 6.8 % (ref 2–10)
NEUTROPHILS # BLD: 6.6 TH/CMM (ref 1.8–8)
NEUTROPHILS NFR BLD AUTO: 69.7 % (ref 40–80)
PLATELET # BLD: 503 TH/CMM (ref 150–400)
PMV BLD AUTO: 7.2 FL
POTASSIUM SERPL-SCNC: 4.3 MEQ/L (ref 3.5–5.1)
RBC # BLD AUTO: 3.74 MIL/CMM (ref 4.3–5.7)
SODIUM SERPL-SCNC: 141 MEQ/L (ref 136–145)
WBC # BLD AUTO: 9.6 TH/CMM (ref 4.8–10.8)

## 2017-01-17 RX ADMIN — ZINC OXIDE SCH APPL: 200 OINTMENT TOPICAL at 16:53

## 2017-01-17 RX ADMIN — POLYETHYLENE GLYCOL 3350 SCH GM: 17 POWDER, FOR SOLUTION ORAL at 16:52

## 2017-01-17 RX ADMIN — DEXTROSE AND SODIUM CHLORIDE SCH MLS/HR: 5; .45 INJECTION, SOLUTION INTRAVENOUS at 12:15

## 2017-01-17 RX ADMIN — Medication SCH TAB: at 09:02

## 2017-01-17 RX ADMIN — POLYETHYLENE GLYCOL 3350 SCH GM: 17 POWDER, FOR SOLUTION ORAL at 09:02

## 2017-01-17 RX ADMIN — ZINC OXIDE SCH APPL: 200 OINTMENT TOPICAL at 09:03

## 2017-01-17 NOTE — INTERNAL MEDICINE PROG NOTE
Internal Medicine Subjective





- Subjective


Patient seen and examined:: with staff, chart reviewed


Patient is:: awake, non-verbal


Patient Complaints of:: congestion


Per staff patient is:: no adverse event, confused





Internal Medicine Objective





- Results


Result Diagrams: 


 01/17/17 05:30





 01/17/17 05:30


Recent Labs: 


 Laboratory Last Values











WBC  9.6 Th/cmm (4.8-10.8)   01/17/17  05:30    


 


RBC  3.74 Mil/cmm (4.30-5.70)  L  01/17/17  05:30    


 


Hgb  9.8 gm/dL (13.2-17.3)  L  01/17/17  05:30    


 


Hct  30.0 % (39.0-49.0)  L  01/17/17  05:30    


 


MCV  80.2 fl (80-99)   01/17/17  05:30    


 


MCH  26.1 pg (26.0-30.0)   01/17/17  05:30    


 


MCHC Differential  32.5 pg (28.0-36.0)   01/17/17  05:30    


 


RDW  15.6 % (11.5-20.0)   01/17/17  05:30    


 


Plt Count  503 Th/cmm (150-400)  H  01/17/17  05:30    


 


MPV  7.2 fl  01/17/17  05:30    


 


Neutrophils %  69.7 % (40.0-80.0)   01/17/17  05:30    


 


Lymphocytes %  16.8 % (20.0-50.0)  L  01/17/17  05:30    


 


Monocytes %  6.8 % (2.0-10.0)   01/17/17  05:30    


 


Eosinophils %  5.8 % (0.0-5.0)  H  01/17/17  05:30    


 


Basophils %  0.9 % (0.0-2.0)   01/17/17  05:30    


 


Sodium  141 mEq/L (136-145)   01/17/17  05:30    


 


Potassium  4.3 mEq/L (3.5-5.1)   01/17/17  05:30    


 


Chloride  112 mEq/L ()  H  01/17/17  05:30    


 


Carbon Dioxide  16.5 mEq/L (21.0-31.0)  L  01/17/17  05:30    


 


Anion Gap  16.8  (7.0-16.0)  H  01/17/17  05:30    


 


BUN  12 mg/dL (7-25)   01/17/17  05:30    


 


Creatinine  1.4 mg/dL (0.7-1.3)  H  01/17/17  05:30    


 


Est GFR ( Amer)  > 60.0 ml/min  01/17/17  05:30    


 


Est GFR (Non-Af Amer)  57.7 ml/min  01/17/17  05:30    


 


BUN/Creatinine Ratio  8.6   01/17/17  05:30    


 


Glucose  112 mg/dL ()  H  01/17/17  05:30    


 


Calcium  9.4 mg/dL (8.6-10.3)   01/17/17  05:30    


 


Urine Source  VIDES PORT   01/13/17  01:00    


 


Urine Color  YELLOW   01/13/17  01:00    


 


Urine Clarity  HAZY  (CLEAR)   01/13/17  01:00    


 


Urine pH  6.0   01/13/17  01:00    


 


Ur Specific Gravity  1.020  (1.005-1.030)   01/13/17  01:00    


 


Urine Protein  >300 mg/dL (NEGATIVE)  H  01/13/17  01:00    


 


Urine Glucose (UA)  NEGATIVE mg/dL (NEGATIVE)   01/13/17  01:00    


 


Urine Ketones  NEGATIVE mg/dL (NEGATIVE)   01/13/17  01:00    


 


Urine Blood  LARGE  (NEGATIVE)  H  01/13/17  01:00    


 


Urine Nitrate  NEGATIVE  (NEGATIVE)   01/13/17  01:00    


 


Urine Bilirubin  NEGATIVE  (NEGATIVE)   01/13/17  01:00    


 


Urine Urobilinogen  0.2 E.U./dL (0.2 - 1.0)   01/13/17  01:00    


 


Ur Leukocyte Esterase  SMALL  (NEGATIVE)  H  01/13/17  01:00    


 


Urine RBC  >100 /hpf (0-5)  H  01/13/17  01:00    


 


Urine WBC  >100 /hpf (0-5)  H  01/13/17  01:00    


 


Ur Epithelial Cells  FEW /lpf (FEW)   01/13/17  01:00    


 


Urine Bacteria  MODERATE /hpf (NONE SEEN)   01/13/17  01:00    














- Physical Exam


Vitals and I&O: 


 Vital Signs











Temp  98.6 F   01/17/17 04:00


 


Pulse  82   01/17/17 09:04


 


Resp  19   01/17/17 04:00


 


BP  163/83   01/17/17 09:04


 


Pulse Ox  98   01/17/17 04:00








 Intake & Output











 01/16/17 01/17/17 01/17/17





 18:59 06:59 18:59


 


Intake Total 900 150 100


 


Output Total  950 


 


Balance 900 -800 100


 


Intake:   


 


  Intake, IV Amount 100 100 100


 


    Piperacillin Sodium/ 100 100 100





    Tazobact 4.5 gm In Sodium   





    Chloride 0.9% 100 ml @   





    100 mls/hr IV Q8HR [facility]   





    #:[id number]   


 


  Oral 800 50 


 


Output:   


 


  Urine  950 


 


Other:   


 


  # Voids 2  


 


  # Bowel Movements 2  


 


  Stool Characteristics Soft  





 Formed  











Active Medications: 


 Current Medications





Acetaminophen (Tylenol 650mg Supp)  650 mg RC Q4HR PRN


   PRN Reason: Pain or Fever >101


   Stop: 03/14/17 00:18


   Last Admin: 01/15/17 09:11 Dose:  650 mg


Acetaminophen/Hydrocodone Bitart (Norco 5mg/325mg)  1 tab PO Q4H PRN


   PRN Reason: Pain (Moderate)


   Stop: 03/14/17 00:18


   Last Admin: 01/14/17 19:56 Dose:  1 tab


Al Hydrox/Mg Hydrox/Simethicone (Maalox)  30 ml PO Q6HR PRN


   PRN Reason: Constipation


   Stop: 03/14/17 00:21


Amlodipine Besylate (Norvasc)  5 mg PO DAILY [facility]


   Stop: 03/14/17 08:59


   Last Admin: 01/17/17 09:04 Dose:  5 mg


Baclofen (Lioresal)  10 mg PO QID [facility]


   Stop: 03/14/17 08:59


   Last Admin: 01/17/17 12:39 Dose:  10 mg


Bisacodyl (Dulcolax 10 Mg Supp)  10 mg RC DAILY PRN


   PRN Reason: Constipation


   Stop: 03/14/17 00:18


Calcium Carbonate (Os-Isai)  500 mg PO TID [facility]


   Stop: 03/14/17 08:59


   Last Admin: 01/17/17 09:02 Dose:  500 mg


Famotidine (Pepcid)  20 mg PO BID [facility]


   Stop: 03/14/17 08:59


   Last Admin: 01/17/17 09:02 Dose:  20 mg


Guaifenesin/Dextromethorphan (Robitussin Dm)  10 ml PO Q8HR PRN


   PRN Reason: Cough


   Stop: 03/15/17 17:28


   Last Admin: 01/15/17 09:11 Dose:  10 ml


Dextrose/Sodium Chloride (D5-0.45ns)  1,000 mls @ 80 mls/hr IV .[id number] [facility]


   Stop: 03/14/17 00:29


   Last Admin: 01/17/17 12:15 Dose:  80 mls/hr


Piperacillin Sod/Tazobactam (Sod 4.5 gm/ Sodium Chloride)  100 mls @ 100 mls/hr 

IV Q8HR [facility]


   Stop: 03/14/17 01:59


   Last Admin: 01/17/17 12:14 Dose:  100 mls/hr


Magnesium Hydroxide (Milk Of Magnesia)  30 ml PO Q72H PRN


   PRN Reason: Constipation


   Stop: 03/14/17 00:18


Metoprolol Tartrate (Lopressor)  50 mg PO BID [facility]


   Stop: 03/14/17 08:59


   Last Admin: 01/17/17 09:03 Dose:  50 mg


Miscellaneous (Vte Chemical Prophylaxis Screen/ Admission)  1 ea MC PRN PRN


   PRN Reason: PROTOCOL


   Stop: 03/14/17 13:08


Ondansetron HCl (Zofran)  4 mg IV Q8H PRN


   PRN Reason: Nausea / Vomiting


   Stop: 03/14/17 00:21


Petrolatum (Zinc Oxide)  1 appl TP BID [facility]


   Stop: 03/14/17 08:59


   Last Admin: 01/17/17 09:03 Dose:  1 appl


Polyethylene Glycol (Miralax)  17 gm PO BID [facility]


   Stop: 03/14/17 08:59


   Last Admin: 01/17/17 09:02 Dose:  17 gm


Vitamin D (Vitamin D)  400 iu PO DAILY [facility]


   Stop: 03/14/17 08:59


   Last Admin: 01/17/17 09:02 Dose:  400 iu








General: weak, lethargic, demented


HEENT: NC/AT, PERRLA


Neck: Supple


Lungs: congested


Cardiovascular: RRR, Normal S1, Normal S2


Abdomen: soft non-tender, globular


Extremities: excoriation, contracture


Neurological: no change





- Procedures


Procedures: 


 Procedures











Procedure Code Date


 


INSERT INDWELLING CATH 57.94 10/16/08


 


INSERT TEMP BLADDER CATH [id number] 10/16/08














Internal Medicine Assmt/Plan





- Assessment


Assessment: 





Fever


Sepsis


uti


cp mr


ri





- Plan


Plan: 





cont on iv abx


cont on iv hyfration 


will check culture


will refer to [facility]


jude rn

## 2017-01-18 RX ADMIN — DEXTROSE AND SODIUM CHLORIDE SCH MLS/HR: 5; .45 INJECTION, SOLUTION INTRAVENOUS at 13:11

## 2017-01-18 RX ADMIN — POLYETHYLENE GLYCOL 3350 SCH GM: 17 POWDER, FOR SOLUTION ORAL at 09:33

## 2017-01-18 RX ADMIN — ZINC OXIDE SCH APPL: 200 OINTMENT TOPICAL at 09:34

## 2017-01-18 RX ADMIN — Medication SCH TAB: at 09:32

## 2017-01-18 NOTE — INTERNAL MEDICINE PROG NOTE
Internal Medicine Subjective





- Subjective


Service Date: 01/18/17


Patient seen and examined:: with staff


Patient is:: awake


Per staff patient is:: no adverse event





Internal Medicine Objective





- Results


Result Diagrams: 


 01/17/17 05:30





 01/17/17 05:30


Recent Labs: 


 Laboratory Last Values











WBC  9.6 Th/cmm (4.8-10.8)   01/17/17  05:30    


 


RBC  3.74 Mil/cmm (4.30-5.70)  L  01/17/17  05:30    


 


Hgb  9.8 gm/dL (13.2-17.3)  L  01/17/17  05:30    


 


Hct  30.0 % (39.0-49.0)  L  01/17/17  05:30    


 


MCV  80.2 fl (80-99)   01/17/17  05:30    


 


MCH  26.1 pg (26.0-30.0)   01/17/17  05:30    


 


MCHC Differential  32.5 pg (28.0-36.0)   01/17/17  05:30    


 


RDW  15.6 % (11.5-20.0)   01/17/17  05:30    


 


Plt Count  503 Th/cmm (150-400)  H  01/17/17  05:30    


 


MPV  7.2 fl  01/17/17  05:30    


 


Neutrophils %  69.7 % (40.0-80.0)   01/17/17  05:30    


 


Lymphocytes %  16.8 % (20.0-50.0)  L  01/17/17  05:30    


 


Monocytes %  6.8 % (2.0-10.0)   01/17/17  05:30    


 


Eosinophils %  5.8 % (0.0-5.0)  H  01/17/17  05:30    


 


Basophils %  0.9 % (0.0-2.0)   01/17/17  05:30    


 


Sodium  141 mEq/L (136-145)   01/17/17  05:30    


 


Potassium  4.3 mEq/L (3.5-5.1)   01/17/17  05:30    


 


Chloride  112 mEq/L ()  H  01/17/17  05:30    


 


Carbon Dioxide  16.5 mEq/L (21.0-31.0)  L  01/17/17  05:30    


 


Anion Gap  16.8  (7.0-16.0)  H  01/17/17  05:30    


 


BUN  12 mg/dL (7-25)   01/17/17  05:30    


 


Creatinine  1.4 mg/dL (0.7-1.3)  H  01/17/17  05:30    


 


Est GFR ( Amer)  > 60.0 ml/min  01/17/17  05:30    


 


Est GFR (Non-Af Amer)  57.7 ml/min  01/17/17  05:30    


 


BUN/Creatinine Ratio  8.6   01/17/17  05:30    


 


Glucose  112 mg/dL ()  H  01/17/17  05:30    


 


Calcium  9.4 mg/dL (8.6-10.3)   01/17/17  05:30    


 


Urine Source  VIDES PORT   01/13/17  01:00    


 


Urine Color  YELLOW   01/13/17  01:00    


 


Urine Clarity  HAZY  (CLEAR)   01/13/17  01:00    


 


Urine pH  6.0   01/13/17  01:00    


 


Ur Specific Gravity  1.020  (1.005-1.030)   01/13/17  01:00    


 


Urine Protein  >300 mg/dL (NEGATIVE)  H  01/13/17  01:00    


 


Urine Glucose (UA)  NEGATIVE mg/dL (NEGATIVE)   01/13/17  01:00    


 


Urine Ketones  NEGATIVE mg/dL (NEGATIVE)   01/13/17  01:00    


 


Urine Blood  LARGE  (NEGATIVE)  H  01/13/17  01:00    


 


Urine Nitrate  NEGATIVE  (NEGATIVE)   01/13/17  01:00    


 


Urine Bilirubin  NEGATIVE  (NEGATIVE)   01/13/17  01:00    


 


Urine Urobilinogen  0.2 E.U./dL (0.2 - 1.0)   01/13/17  01:00    


 


Ur Leukocyte Esterase  SMALL  (NEGATIVE)  H  01/13/17  01:00    


 


Urine RBC  >100 /hpf (0-5)  H  01/13/17  01:00    


 


Urine WBC  >100 /hpf (0-5)  H  01/13/17  01:00    


 


Ur Epithelial Cells  FEW /lpf (FEW)   01/13/17  01:00    


 


Urine Bacteria  MODERATE /hpf (NONE SEEN)   01/13/17  01:00    














- Physical Exam


Vitals and I&O: 


 Vital Signs











Temp  97.6 F   01/18/17 12:13


 


Pulse  93   01/18/17 12:13


 


Resp  18   01/18/17 12:13


 


BP  97/71   01/18/17 12:13


 


Pulse Ox  97   01/18/17 12:13








 Intake & Output











 01/17/17 01/18/17 01/18/17





 18:59 06:59 18:59


 


Intake Total 1700 1100 100


 


Output Total 1200  


 


Balance 500 1100 100


 


Intake:   


 


  Intake, IV Amount 200 1100 100


 


    D5-0.45NS 1,000 ml @ 80  1000 





    mls/hr IV .[id number] [facility] Rx   





    #:[id number]   


 


    Piperacillin Sodium/ 200 100 100





    Tazobact 4.5 gm In Sodium   





    Chloride 0.9% 100 ml @   





    100 mls/hr IV Q8HR [facility]   





    #:[id number]   


 


  Oral 1500  


 


Output:   


 


  Urine 1200  


 


Other:   


 


  Stool Characteristics Soft  Soft





 Formed  Formed











Active Medications: 


 Current Medications





Acetaminophen (Tylenol 650mg Supp)  650 mg RC Q4HR PRN


   PRN Reason: Pain or Fever >101


   Stop: 03/14/17 00:18


   Last Admin: 01/15/17 09:11 Dose:  650 mg


Acetaminophen/Hydrocodone Bitart (Norco 5mg/325mg)  1 tab PO Q4H PRN


   PRN Reason: Pain (Moderate)


   Stop: 03/14/17 00:18


   Last Admin: 01/14/17 19:56 Dose:  1 tab


Al Hydrox/Mg Hydrox/Simethicone (Maalox)  30 ml PO Q6HR PRN


   PRN Reason: Constipation


   Stop: 03/14/17 00:21


Amlodipine Besylate (Norvasc)  5 mg PO DAILY [facility]


   Stop: 03/14/17 08:59


   Last Admin: 01/18/17 09:32 Dose:  5 mg


Baclofen (Lioresal)  10 mg PO QID [facility]


   Stop: 03/14/17 08:59


   Last Admin: 01/18/17 13:03 Dose:  10 mg


Bisacodyl (Dulcolax 10 Mg Supp)  10 mg RC DAILY PRN


   PRN Reason: Constipation


   Stop: 03/14/17 00:18


Calcium Carbonate (Os-Isai)  500 mg PO TID [facility]


   Stop: 03/14/17 08:59


   Last Admin: 01/18/17 13:03 Dose:  500 mg


Famotidine (Pepcid)  20 mg PO BID [facility]


   Stop: 03/14/17 08:59


   Last Admin: 01/18/17 09:31 Dose:  20 mg


Guaifenesin/Dextromethorphan (Robitussin Dm)  10 ml PO Q8HR PRN


   PRN Reason: Cough


   Stop: 03/15/17 17:28


   Last Admin: 01/15/17 09:11 Dose:  10 ml


Dextrose/Sodium Chloride (D5-0.45ns)  1,000 mls @ 80 mls/hr IV .[id number] [facility]


   Stop: 03/14/17 00:29


   Last Admin: 01/18/17 13:11 Dose:  80 mls/hr


Piperacillin Sod/Tazobactam (Sod 4.5 gm/ Sodium Chloride)  100 mls @ 100 mls/hr 

IV Q8HR [facility]


   Stop: 03/14/17 01:59


   Last Admin: 01/18/17 13:03 Dose:  100 mls/hr


Magnesium Hydroxide (Milk Of Magnesia)  30 ml PO Q72H PRN


   PRN Reason: Constipation


   Stop: 03/14/17 00:18


Metoprolol Tartrate (Lopressor)  50 mg PO BID [facility]


   Stop: 03/14/17 08:59


   Last Admin: 01/18/17 09:31 Dose:  50 mg


Miscellaneous (Vte Chemical Prophylaxis Screen/ Admission)  1 ea MC PRN PRN


   PRN Reason: PROTOCOL


   Stop: 03/14/17 13:08


Ondansetron HCl (Zofran)  4 mg IV Q8H PRN


   PRN Reason: Nausea / Vomiting


   Stop: 03/14/17 00:21


Petrolatum (Zinc Oxide)  1 appl TP BID [facility]


   Stop: 03/14/17 08:59


   Last Admin: 01/18/17 09:34 Dose:  1 appl


Polyethylene Glycol (Miralax)  17 gm PO BID [facility]


   Stop: 03/14/17 08:59


   Last Admin: 01/18/17 09:33 Dose:  17 gm


Vitamin D (Vitamin D)  400 iu PO DAILY [facility]


   Stop: 03/14/17 08:59


   Last Admin: 01/17/17 09:02 Dose:  400 iu








General: alert


HEENT: NC/AT, PERRLA


Neck: Supple


Lungs: CTAB


Cardiovascular: RRR, Normal S1, Normal S2, without murmur


Abdomen: soft non-tender, non-distended, positive bowel sound


Neurological: no change





- Procedures


Procedures: 


 Procedures











Procedure Code Date


 


INSERT INDWELLING CATH 57.94 10/16/08


 


INSERT TEMP BLADDER CATH [id number] 10/16/08














Internal Medicine Assmt/Plan





- Assessment


Assessment: 





Fever


Sepsis





- Plan


Plan: 


LTAC EVAL 


MONITOR FOR FEVER


IVABX


CBC/BMP IN AM 


IVF FOR HYDRATION

## 2017-02-15 NOTE — DISCHARGE SUMMARY
Dictating for Dr. Ramone Elias.



FINAL DIAGNOSES:  Fever and sepsis.



HISTORY OF PRESENT ILLNESS:  This 46-year-old male resident of [facility] brought to [facility] for a day history of fever of

102.5.  The patient was recently at [facility] where he had

extracorporeal shockwave lithotripsy and urological surgery and right flank

incision.  After the patient had that surgery ____ the patient started to

develop a fever.



PHYSICAL EXAMINATION:

GENERAL:  The patient is well developed, well nourished, in no acute distress.

VITAL SIGNS:  Stable.

HEENT:  Head is normocephalic and atraumatic.

NECK:  Supple.  No mass.

LUNGS:  Clear.

CARDIOVASCULAR:  Heart regular rate and rhythm.

ABDOMEN:  Soft, nontender and nondistended.



HOSPITAL COURSE:  During the hospital stay, the patient was admitted to

medical/surgical unit.  The patient was kept on IV fluids for hydration, also IV

antibiotics of Zosyn.  The patient's CBC, BMP were being monitored and was

stable.  Due to continuation of IV therapy, the patient was transferred to

[facility].



CONDITION UPON DISCHARGE:  Fair.



DISPOSITION:  [facility].





DD: 02/14/2017 15:42

DT: 02/15/2017 03:01

JOB# [id number]  [id number]

## 2017-10-14 ENCOUNTER — HOSPITAL ENCOUNTER (INPATIENT)
Dept: HOSPITAL 36 - MSI | Age: 47
LOS: 2 days | Discharge: NURSING, REHAB OR LONG-TERM CARE | DRG: 689 | End: 2017-10-16
Attending: INTERNAL MEDICINE | Admitting: INTERNAL MEDICINE
Payer: MEDICARE

## 2017-10-14 VITALS — DIASTOLIC BLOOD PRESSURE: 98 MMHG | SYSTOLIC BLOOD PRESSURE: 162 MMHG

## 2017-10-14 DIAGNOSIS — H54.7: ICD-10-CM

## 2017-10-14 DIAGNOSIS — Z93.1: ICD-10-CM

## 2017-10-14 DIAGNOSIS — M81.0: ICD-10-CM

## 2017-10-14 DIAGNOSIS — N39.0: Primary | ICD-10-CM

## 2017-10-14 DIAGNOSIS — Z16.24: ICD-10-CM

## 2017-10-14 DIAGNOSIS — I42.9: ICD-10-CM

## 2017-10-14 DIAGNOSIS — N20.0: ICD-10-CM

## 2017-10-14 DIAGNOSIS — I10: ICD-10-CM

## 2017-10-14 DIAGNOSIS — G80.0: ICD-10-CM

## 2017-10-14 PROCEDURE — Z7610: HCPCS

## 2017-10-15 LAB
ALBUMIN/GLOB SERPL: 1 {RATIO} (ref 1–1.8)
ALP SERPL-CCNC: 143 U/L (ref 34–104)
ALT SERPL-CCNC: 57 U/L (ref 7–52)
AMMONIA BLD-SCNC: 38 UMOL/L (ref 16–53)
ANION GAP SERPL CALC-SCNC: 10 MMOL/L (ref 7–16)
AST SERPL-CCNC: 41 U/L (ref 13–39)
BASOPHILS NFR BLD AUTO: 0.3 % (ref 0–2)
BILIRUB SERPL-MCNC: 0.6 MG/DL (ref 0.3–1)
BUN SERPL-MCNC: 17 MG/DL (ref 7–25)
BUN/CREAT SERPL: 12.1
CALCIUM SERPL-MCNC: 9.3 MG/DL (ref 8.6–10.3)
CHLORIDE SERPL-SCNC: 105 MEQ/L (ref 98–107)
CO2 SERPL-SCNC: 26.4 MEQ/L (ref 21–31)
CREAT SERPL-MCNC: 1.4 MG/DL (ref 0.7–1.3)
EOSINOPHIL NFR BLD AUTO: 3.5 % (ref 0–5)
ERYTHROCYTE [DISTWIDTH] IN BLOOD BY AUTOMATED COUNT: 14.8 % (ref 11.5–20)
GLOBULIN SER-MCNC: 3.7 GM/DL
GLUCOSE SERPL-MCNC: 99 MG/DL (ref 70–105)
HCT VFR BLD CALC: 47.6 % (ref 41–60)
HGB BLD-MCNC: 15.8 GM/DL (ref 12–16)
INR PPP: 1 (ref 0.5–1.4)
LYMPHOCYTES NFR BLD AUTO: 21 % (ref 20–50)
MAGNESIUM SERPL-MCNC: 1.9 MG/DL (ref 1.9–2.7)
MCH RBC QN AUTO: 29.3 PG (ref 26–30)
MCHC RBC AUTO-ENTMCNC: 33.2 PG (ref 28–36)
MCV RBC AUTO: 88.1 FL (ref 80–99)
MONOCYTES NFR BLD AUTO: 5.6 % (ref 2–10)
NEUTROPHILS # BLD: 4.9 TH/CMM (ref 1.8–8)
NEUTROPHILS NFR BLD AUTO: 69.6 % (ref 40–80)
PLATELET # BLD: 213 TH/CMM (ref 150–400)
PMV BLD AUTO: 6.4 FL
POTASSIUM SERPL-SCNC: 4.4 MEQ/L (ref 3.5–5.1)
PROTHROMBIN TIME: 10.4 SECONDS (ref 9.5–11.5)
RBC # BLD AUTO: 5.41 MIL/CMM (ref 4.3–5.7)
SODIUM SERPL-SCNC: 137 MEQ/L (ref 136–145)
TSH SERPL-ACNC: 2.48 UIU/ML (ref 0.34–5.6)
WBC # BLD AUTO: 7 TH/CMM (ref 4.8–10.8)

## 2017-10-15 RX ADMIN — ZINC OXIDE SCH APPL: 200 OINTMENT TOPICAL at 09:08

## 2017-10-15 RX ADMIN — POLYETHYLENE GLYCOL 3350 SCH: 17 POWDER, FOR SOLUTION ORAL at 17:00

## 2017-10-15 RX ADMIN — POLYETHYLENE GLYCOL 3350 SCH GM: 17 POWDER, FOR SOLUTION ORAL at 08:28

## 2017-10-15 RX ADMIN — ZINC OXIDE SCH APPL: 200 OINTMENT TOPICAL at 17:00

## 2017-10-15 NOTE — INTERNAL MEDICINE PROG NOTE
Internal Medicine Subjective





- Subjective


Service Date: 10/15/17 ([id number]  [facility] dictated)





Internal Medicine Objective





- Results


Result Diagrams: 


 10/15/17 06:19





 10/15/17 06:19


Recent Labs: 


 Laboratory Last Values











WBC  7.0 Th/cmm (4.8-10.8)  D 10/15/17  06:19    


 


RBC  5.41 Mil/cmm (4.30-5.70)   10/15/17  06:19    


 


Hgb  15.8 gm/dL (12-16)  D 10/15/17  06:19    


 


Hct  47.6 % (41.0-60)  D 10/15/17  06:19    


 


MCV  88.1 fl (80-99)   10/15/17  06:19    


 


MCH  29.3 pg (26.0-30.0)   10/15/17  06:19    


 


MCHC Differential  33.2 pg (28.0-36.0)   10/15/17  06:19    


 


RDW  14.8 % (11.5-20.0)   10/15/17  06:19    


 


Plt Count  213 Th/cmm (150-400)  D 10/15/17  06:19    


 


MPV  6.4 fl  10/15/17  06:19    


 


Neutrophils %  69.6 % (40.0-80.0)   10/15/17  06:19    


 


Lymphocytes %  21.0 % (20.0-50.0)   10/15/17  06:19    


 


Monocytes %  5.6 % (2.0-10.0)   10/15/17  06:19    


 


Eosinophils %  3.5 % (0.0-5.0)   10/15/17  06:19    


 


Basophils %  0.3 % (0.0-2.0)   10/15/17  06:19    


 


PT  10.4 SECONDS (9.5-11.5)   10/15/17  06:19    


 


INR  1.00  (0.5-1.4)   10/15/17  06:19    


 


PTT (Actin FS)  27.2 SECONDS (26.0-38.0)   10/15/17  06:19    


 


Sodium  137 mEq/L (136-145)   10/15/17  06:19    


 


Potassium  4.4 mEq/L (3.5-5.1)   10/15/17  06:19    


 


Chloride  105 mEq/L ()   10/15/17  06:19    


 


Carbon Dioxide  26.4 mEq/L (21.0-31.0)   10/15/17  06:19    


 


Anion Gap  10.0  (7.0-16.0)   10/15/17  06:19    


 


BUN  17 mg/dL (7-25)   10/15/17  06:19    


 


Creatinine  1.4 mg/dL (0.7-1.3)  H  10/15/17  06:19    


 


Est GFR ( Amer)  > 60.0 ml/min (>90)   10/15/17  06:19    


 


Est GFR (Non-Af Amer)  57.5 ml/min  10/15/17  06:19    


 


BUN/Creatinine Ratio  12.1   10/15/17  06:19    


 


Glucose  99 mg/dL ()   10/15/17  06:19    


 


Calcium  9.3 mg/dL (8.6-10.3)   10/15/17  06:19    


 


Magnesium  1.9 mg/dL (1.9-2.7)   10/15/17  06:19    


 


Total Bilirubin  0.6 mg/dL (0.3-1.0)   10/15/17  06:19    


 


AST  41 U/L (13-39)  H  10/15/17  06:19    


 


ALT  57 U/L (7-52)  H  10/15/17  06:19    


 


Alkaline Phosphatase  143 U/L ()  H  10/15/17  06:19    


 


Ammonia  38 umol/L (16-53)   10/15/17  06:19    


 


Total Protein  7.3 gm/dL (6.0-8.3)   10/15/17  06:19    


 


Albumin  3.6 gm/dL (4.2-5.5)  L  10/15/17  06:19    


 


Globulin  3.7 gm/dL  10/15/17  06:19    


 


Albumin/Globulin Ratio  1.0  (1.0-1.8)   10/15/17  06:19    


 


TSH  2.48 uIU/ml (0.34-5.60)   10/15/17  06:19    














- Physical Exam


Vitals and I&O: 


 Vital Signs











Temp  97.3 F   10/15/17 12:01


 


Pulse  78   10/15/17 12:01


 


Resp  18   10/15/17 12:01


 


BP  113/72   10/15/17 12:01


 


Pulse Ox  97   10/15/17 12:01








 Intake & Output











 10/14/17 10/15/17 10/15/17





 18:59 06:59 18:59


 


Intake Total  200 


 


Balance  200 


 


Intake:   


 


  Intake, IV Amount  200 


 


    Piperacillin Sodium/  200 





    Tazobact 4.5 gm In Sodium   





    Chloride 0.9% 100 ml @   





    100 mls/hr IV Q8HR [facility]   





    #:[id number]   











Active Medications: 


Current Medications





Acetaminophen (Tylenol)  650 mg PO Q4HR PRN


   PRN Reason: Pain or Fever >101


   Stop: 12/13/17 19:42


Acetaminophen/Hydrocodone Bitart (Norco 5mg/325mg)  1 tab PO Q4H PRN


   PRN Reason: Pain (Moderate)


   Stop: 12/13/17 19:47


Al Hydrox/Mg Hydrox/Simethicone (Maalox)  30 ml PO Q6HR PRN


   PRN Reason: Constipation


   Stop: 12/13/17 19:42


Amlodipine Besylate (Norvasc)  5 mg PO DAILY [facility]


   Stop: 12/14/17 08:59


   Last Admin: 10/15/17 08:28 Dose:  5 mg


Baclofen (Lioresal)  10 mg PO QID [facility]


   Stop: 12/13/17 20:59


   Last Admin: 10/15/17 12:35 Dose:  10 mg


Bisacodyl (Dulcolax 10 Mg Supp)  10 mg RC DAILY PRN


   PRN Reason: Constipation


   Stop: 12/13/17 19:42


Calcium Carbonate (Os-Isai)  500 mg PO TIDWM [facility]


   Stop: 12/13/17 20:59


   Last Admin: 10/15/17 12:26 Dose:  500 mg


Famotidine (Pepcid)  20 mg PO BID [facility]


   Stop: 12/14/17 08:59


   Last Admin: 10/15/17 08:28 Dose:  20 mg


Guaifenesin/Dextromethorphan (Robitussin Dm)  10 ml PO Q8HR PRN


   PRN Reason: Cough


   Stop: 12/13/17 19:47


Piperacillin Sod/Tazobactam (Sod 4.5 gm/ Sodium Chloride)  100 mls @ 100 mls/hr 

IV Q8HR [facility]


   Stop: 12/13/17 20:59


   Last Admin: 10/15/17 12:35 Dose:  100 mls/hr


Lactobacillus Rhamnosus (Culturelle)  1 each PO DAILY [facility]


   Stop: 12/15/17 08:59


Magnesium Hydroxide (Milk Of Magnesia)  30 ml PO Q72H PRN


   PRN Reason: Constipation


   Stop: 12/13/17 19:47


Metoprolol Tartrate (Lopressor)  50 mg PO BID [facility]


   Stop: 12/14/17 08:59


   Last Admin: 10/15/17 08:31 Dose:  50 mg


Miscellaneous (Probiotic Screen)  1 ea MC PRN PRN


   PRN Reason: PROTOCOL


   Stop: 12/14/17 10:59


Ondansetron HCl (Zofran)  4 mg IV Q8H PRN


   PRN Reason: Nausea / Vomiting


   Stop: 12/13/17 19:45


Petrolatum (Zinc Oxide)  1 appl TP BID [facility]


   Stop: 12/14/17 08:59


   Last Admin: 10/15/17 09:08 Dose:  1 appl


Polyethylene Glycol (Miralax)  17 gm PO BID [facility]


   Stop: 12/14/17 08:59


   Last Admin: 10/15/17 08:28 Dose:  17 gm


Simvastatin (Zocor)  10 mg PO HS [facility]


   Stop: 12/13/17 20:59


   Last Admin: 10/14/17 21:48 Dose:  10 mg


Vitamin D (Vitamin D)  400 iu PO DAILY [facility]


   Stop: 12/14/17 08:59


   Last Admin: 10/15/17 08:32 Dose:  400 iu











- Procedures


Procedures: 


 Procedures











Procedure Code Date


 


INSERT INDWELLING CATH 57.94 10/16/08


 


INSERT TEMP BLADDER CATH [id number] 10/16/08














Internal Medicine Assmt/Plan





- Assessment


Assessment: 





ACUTE UTI


MDRO


HTN


SPASTIC QUADRIPLEGIA


OSTEOPOROSIS

## 2017-10-15 NOTE — HISTORY & PHYSICAL
ADMIT DATE:  10/15/2017



Dictated for:  Dr. Ramone Elias.



CHIEF COMPLAINT:  UTI.



HISTORY OF PRESENT ILLNESS:  This is a 47-year-old male, who is a resident ____

[facility], who is a direct admission from [place], was brought here to [facility] for UTI with MDRO.  Upon examination, the patient is a poor

historian, unable to answer any questions.  The patient is nonverbal.



PAST MEDICAL HISTORY:  Cerebral palsy, spastic quadriplegia, osteoporosis,

hypertension, history of bilateral nephrolithiasis, recurrent UTI, mitral valve

regurgitation, gastritis, renal calculi, sinus tachycardia, sepsis, aspiration

pneumonia, hypoxic respiratory failure, pleural effusion, cardiomyopathy,

profound visual impairment.



PAST SURGICAL HISTORY:  PEG.



SOCIAL HISTORY:  The patient is a nursing home resident, requiring 24-hour

nursing care.



FAMILY HISTORY:  Noncontributory.



REVIEW OF SYSTEMS:  Unable to obtain, the patient is nonverbal.



PHYSICAL EXAMINATION:

GENERAL:  This is a middle aged male, who is awake, nonverbal, no apparent

distress.

VITAL SIGNS:  Temperature 97.3, heart rate 78, blood pressure 113/72,

respirations 18, O2 of 97%.

HEENT:  Head; normocephalic, atraumatic.

NECK:  Supple.  No mass.

LUNGS:  Few rhonchi.

HEART:  Regular rhythm.

ABDOMEN:  Soft, nontender.



LABORATORY DATA:  WBC 7.0, H and H of ____, platelet 213.



Sodium 137, potassium 4.4, chloride 105, BUN ____.



ASSESSMENT:  Urinary tract infection with multidrug resistant organisms,

cerebral palsy, spastic quadriplegia, osteoporosis, hypertension, bilateral

nephrolithiasis, renal calculi, visual impairment.



PLAN:  The patient to be admitted to the med/surg unit.  The patient will be

kept on IV fluids for hydration.  The patient will be on IV antibiotics of

Zosyn.  Monitor the patient's CBC and BMP.  We will continue to monitor this

patient.





DD: 10/15/2017 14:33

DT: 10/15/2017 17:22

JOB# [id number]  [id number]

## 2017-10-16 RX ADMIN — Medication SCH: at 08:59

## 2017-10-16 RX ADMIN — ZINC OXIDE SCH APPL: 200 OINTMENT TOPICAL at 09:00

## 2017-10-16 RX ADMIN — POLYETHYLENE GLYCOL 3350 SCH GM: 17 POWDER, FOR SOLUTION ORAL at 17:28

## 2017-10-16 RX ADMIN — Medication SCH EACH: at 08:45

## 2017-10-16 RX ADMIN — POLYETHYLENE GLYCOL 3350 SCH GM: 17 POWDER, FOR SOLUTION ORAL at 08:47

## 2017-10-16 RX ADMIN — POLYETHYLENE GLYCOL 3350 SCH: 17 POWDER, FOR SOLUTION ORAL at 08:59

## 2017-10-16 NOTE — INTERNAL MEDICINE PROG NOTE
Internal Medicine Subjective





- Subjective


Service Date: 10/16/17


Patient seen and examined:: with staff


Patient is:: awake


Per staff patient has:: tolerating meds





Internal Medicine Objective





- Results


Result Diagrams: 


 10/15/17 06:19





 10/15/17 06:19


Recent Labs: 


 Laboratory Last Values











WBC  7.0 Th/cmm (4.8-10.8)  D 10/15/17  06:19    


 


RBC  5.41 Mil/cmm (4.30-5.70)   10/15/17  06:19    


 


Hgb  15.8 gm/dL (12-16)  D 10/15/17  06:19    


 


Hct  47.6 % (41.0-60)  D 10/15/17  06:19    


 


MCV  88.1 fl (80-99)   10/15/17  06:19    


 


MCH  29.3 pg (26.0-30.0)   10/15/17  06:19    


 


MCHC Differential  33.2 pg (28.0-36.0)   10/15/17  06:19    


 


RDW  14.8 % (11.5-20.0)   10/15/17  06:19    


 


Plt Count  213 Th/cmm (150-400)  D 10/15/17  06:19    


 


MPV  6.4 fl  10/15/17  06:19    


 


Neutrophils %  69.6 % (40.0-80.0)   10/15/17  06:19    


 


Lymphocytes %  21.0 % (20.0-50.0)   10/15/17  06:19    


 


Monocytes %  5.6 % (2.0-10.0)   10/15/17  06:19    


 


Eosinophils %  3.5 % (0.0-5.0)   10/15/17  06:19    


 


Basophils %  0.3 % (0.0-2.0)   10/15/17  06:19    


 


PT  10.4 SECONDS (9.5-11.5)   10/15/17  06:19    


 


INR  1.00  (0.5-1.4)   10/15/17  06:19    


 


PTT (Actin FS)  27.2 SECONDS (26.0-38.0)   10/15/17  06:19    


 


Sodium  137 mEq/L (136-145)   10/15/17  06:19    


 


Potassium  4.4 mEq/L (3.5-5.1)   10/15/17  06:19    


 


Chloride  105 mEq/L ()   10/15/17  06:19    


 


Carbon Dioxide  26.4 mEq/L (21.0-31.0)   10/15/17  06:19    


 


Anion Gap  10.0  (7.0-16.0)   10/15/17  06:19    


 


BUN  17 mg/dL (7-25)   10/15/17  06:19    


 


Creatinine  1.4 mg/dL (0.7-1.3)  H  10/15/17  06:19    


 


Est GFR ( Amer)  > 60.0 ml/min (>90)   10/15/17  06:19    


 


Est GFR (Non-Af Amer)  57.5 ml/min  10/15/17  06:19    


 


BUN/Creatinine Ratio  12.1   10/15/17  06:19    


 


Glucose  99 mg/dL ()   10/15/17  06:19    


 


Calcium  9.3 mg/dL (8.6-10.3)   10/15/17  06:19    


 


Magnesium  1.9 mg/dL (1.9-2.7)   10/15/17  06:19    


 


Total Bilirubin  0.6 mg/dL (0.3-1.0)   10/15/17  06:19    


 


AST  41 U/L (13-39)  H  10/15/17  06:19    


 


ALT  57 U/L (7-52)  H  10/15/17  06:19    


 


Alkaline Phosphatase  143 U/L ()  H  10/15/17  06:19    


 


Ammonia  38 umol/L (16-53)   10/15/17  06:19    


 


Total Protein  7.3 gm/dL (6.0-8.3)   10/15/17  06:19    


 


Albumin  3.6 gm/dL (4.2-5.5)  L  10/15/17  06:19    


 


Globulin  3.7 gm/dL  10/15/17  06:19    


 


Albumin/Globulin Ratio  1.0  (1.0-1.8)   10/15/17  06:19    


 


TSH  2.48 uIU/ml (0.34-5.60)   10/15/17  06:19    














- Physical Exam


Vitals and I&O: 


 Vital Signs











Temp  98.2 F   10/16/17 12:00


 


Pulse  105   10/16/17 12:00


 


Resp  18   10/16/17 12:00


 


BP  121/73   10/16/17 12:00


 


Pulse Ox  98   10/16/17 12:00








 Intake & Output











 10/15/17 10/16/17 10/16/17





 18:59 06:59 18:59


 


Intake Total 900 400 


 


Balance 900 400 


 


Weight (lbs) 145 lb 145 lb 


 


Intake:   


 


  Intake, IV Amount 100 400 


 


    Piperacillin Sodium/ 100 200 





    Tazobact 4.5 gm In Sodium   





    Chloride 0.9% 100 ml @   





    100 mls/hr IV Q8HR [facility] Rx   





    #:[id number]   


 


  Oral 800  


 


Other:   


 


  # Voids 3  


 


  # Bowel Movements 1  











Active Medications: 


Current Medications





Acetaminophen (Tylenol)  650 mg PO Q4HR PRN


   PRN Reason: Pain or Fever >101


   Stop: 12/13/17 19:42


Acetaminophen/Hydrocodone Bitart (Norco 5mg/325mg)  1 tab PO Q4H PRN


   PRN Reason: Pain (Moderate)


   Stop: 12/13/17 19:47


Al Hydrox/Mg Hydrox/Simethicone (Maalox)  30 ml PO Q6HR PRN


   PRN Reason: Constipation


   Stop: 12/13/17 19:42


Amlodipine Besylate (Norvasc)  5 mg PO DAILY [facility]


   Stop: 12/14/17 08:59


   Last Admin: 10/16/17 08:58 Dose:  Not Given


Baclofen (Lioresal)  10 mg PO QID [facility]


   Stop: 12/13/17 20:59


   Last Admin: 10/16/17 12:57 Dose:  10 mg


Bisacodyl (Dulcolax 10 Mg Supp)  10 mg RC DAILY PRN


   PRN Reason: Constipation


   Stop: 12/13/17 19:42


Calcium Carbonate (Os-Isai)  500 mg PO TIDWM [facility]


   Stop: 12/13/17 20:59


   Last Admin: 10/16/17 12:57 Dose:  500 mg


Famotidine (Pepcid)  20 mg PO BID [facility]


   Stop: 12/14/17 08:59


   Last Admin: 10/16/17 08:46 Dose:  20 mg


Guaifenesin/Dextromethorphan (Robitussin Dm)  10 ml PO Q8HR PRN


   PRN Reason: Cough


   Stop: 12/13/17 19:47


Piperacillin Sod/Tazobactam (Sod 4.5 gm/ Sodium Chloride)  100 mls @ 100 mls/hr 

IV Q8HR [facility]


   Stop: 12/13/17 20:59


   Last Admin: 10/16/17 12:56 Dose:  100 mls/hr


Lactobacillus Rhamnosus (Culturelle)  1 each PO DAILY [facility]


   Stop: 12/15/17 08:59


   Last Admin: 10/16/17 08:59 Dose:  Not Given


Magnesium Hydroxide (Milk Of Magnesia)  30 ml PO Q72H PRN


   PRN Reason: Constipation


   Stop: 12/13/17 19:47


Metoprolol Tartrate (Lopressor)  50 mg PO BID [facility]


   Stop: 12/14/17 08:59


   Last Admin: 10/16/17 08:59 Dose:  Not Given


Miscellaneous (Probiotic Screen)  1 ea MC PRN PRN


   PRN Reason: PROTOCOL


   Stop: 12/14/17 10:59


Ondansetron HCl (Zofran)  4 mg IV Q8H PRN


   PRN Reason: Nausea / Vomiting


   Stop: 12/13/17 19:45


Petrolatum (Zinc Oxide)  1 appl TP BID [facility]


   Stop: 12/14/17 08:59


   Last Admin: 10/16/17 09:00 Dose:  1 appl


Polyethylene Glycol (Miralax)  17 gm PO BID [facility]


   Stop: 12/14/17 08:59


   Last Admin: 10/16/17 08:59 Dose:  Not Given


Simvastatin (Zocor)  10 mg PO HS [facility]


   Stop: 12/13/17 20:59


   Last Admin: 10/15/17 21:16 Dose:  10 mg


Vitamin D (Vitamin D)  400 iu PO DAILY [facility]


   Stop: 12/14/17 08:59


   Last Admin: 10/16/17 09:00 Dose:  Not Given








General: weak


HEENT: NC/AT, PERRLA


Lungs: CTAB


Cardiovascular: RRR, Normal S1, Normal S2, without murmur


Abdomen: soft, non-tender, non-distended, positive bowel sound


Neurological: other (NONVERBAL)





- Procedures


Procedures: 


 Procedures











Procedure Code Date


 


INSERT INDWELLING CATH 57.94 10/16/08


 


INSERT TEMP BLADDER CATH [id number] 10/16/08














Internal Medicine Assmt/Plan





- Assessment


Assessment: 





ACUTE UTI


MDRO


HTN


SPASTIC QUADRIPLEGIA


OSTEOPOROSIS








- Plan


Plan: 





continue ivantibiotics


ivf for hydration


patient to be transferred to [facility] tomorrow.

## 2017-10-17 LAB
FOLATE SERPL-MCNC: >20 NG/ML (ref 3–?)
VIT B12 SERPL-MCNC: 918 PG/ML (ref 211–946)

## 2019-01-16 ENCOUNTER — HOSPITAL ENCOUNTER (INPATIENT)
Dept: HOSPITAL 36 - ER | Age: 49
LOS: 5 days | Discharge: NURSING, REHAB OR LONG-TERM CARE | DRG: 871 | End: 2019-01-21
Attending: INTERNAL MEDICINE | Admitting: INTERNAL MEDICINE
Payer: MEDICARE

## 2019-01-16 DIAGNOSIS — F73: ICD-10-CM

## 2019-01-16 DIAGNOSIS — Z88.8: ICD-10-CM

## 2019-01-16 DIAGNOSIS — N18.5: ICD-10-CM

## 2019-01-16 DIAGNOSIS — N13.6: ICD-10-CM

## 2019-01-16 DIAGNOSIS — Z93.1: ICD-10-CM

## 2019-01-16 DIAGNOSIS — E86.0: ICD-10-CM

## 2019-01-16 DIAGNOSIS — Z86.73: ICD-10-CM

## 2019-01-16 DIAGNOSIS — R31.9: ICD-10-CM

## 2019-01-16 DIAGNOSIS — E43: ICD-10-CM

## 2019-01-16 DIAGNOSIS — K56.7: ICD-10-CM

## 2019-01-16 DIAGNOSIS — I12.0: ICD-10-CM

## 2019-01-16 DIAGNOSIS — N11.9: ICD-10-CM

## 2019-01-16 DIAGNOSIS — I42.9: ICD-10-CM

## 2019-01-16 DIAGNOSIS — Z82.49: ICD-10-CM

## 2019-01-16 DIAGNOSIS — G80.0: ICD-10-CM

## 2019-01-16 DIAGNOSIS — I34.0: ICD-10-CM

## 2019-01-16 DIAGNOSIS — A41.9: Primary | ICD-10-CM

## 2019-01-16 DIAGNOSIS — F03.90: ICD-10-CM

## 2019-01-16 DIAGNOSIS — G40.909: ICD-10-CM

## 2019-01-16 DIAGNOSIS — K21.9: ICD-10-CM

## 2019-01-16 DIAGNOSIS — M81.0: ICD-10-CM

## 2019-01-16 DIAGNOSIS — E87.1: ICD-10-CM

## 2019-01-16 DIAGNOSIS — Z88.2: ICD-10-CM

## 2019-01-16 DIAGNOSIS — B96.4: ICD-10-CM

## 2019-01-16 DIAGNOSIS — E78.00: ICD-10-CM

## 2019-01-16 DIAGNOSIS — K80.80: ICD-10-CM

## 2019-01-16 DIAGNOSIS — E78.5: ICD-10-CM

## 2019-01-16 DIAGNOSIS — Z95.0: ICD-10-CM

## 2019-01-16 DIAGNOSIS — N17.9: ICD-10-CM

## 2019-01-16 LAB
ALBUMIN SERPL-MCNC: 3.2 GM/DL (ref 4.2–5.5)
ALBUMIN/GLOB SERPL: 0.8 {RATIO} (ref 1–1.8)
ALP SERPL-CCNC: 172 U/L (ref 34–104)
ALT SERPL-CCNC: 20 U/L (ref 7–52)
AMYLASE SERPL-CCNC: 50 U/L (ref 29–103)
ANION GAP SERPL CALC-SCNC: 16.4 MMOL/L (ref 7–16)
APPEARANCE UR: (no result)
AST SERPL-CCNC: 18 U/L (ref 13–39)
BACTERIA #/AREA URNS HPF: (no result) /HPF
BASOPHILS # BLD AUTO: 0.1 TH/CUMM (ref 0–0.2)
BASOPHILS NFR BLD AUTO: 0.5 % (ref 0–2)
BILIRUB SERPL-MCNC: 0.4 MG/DL (ref 0.3–1)
BILIRUB UR-MCNC: NEGATIVE MG/DL
BUN SERPL-MCNC: 61 MG/DL (ref 7–25)
CALCIUM SERPL-MCNC: 10 MG/DL (ref 8.6–10.3)
CHLORIDE SERPL-SCNC: 102 MEQ/L (ref 98–107)
CHOLEST SERPL-MCNC: 73 MG/DL (ref ?–200)
CK SERPL-CCNC: 85 U/L (ref 30–223)
CO2 SERPL-SCNC: 20.7 MEQ/L (ref 21–31)
COLOR UR: YELLOW
CREAT SERPL-MCNC: 5.1 MG/DL (ref 0.7–1.3)
EOSINOPHIL # BLD AUTO: 0.1 TH/CMM (ref 0.1–0.4)
EOSINOPHIL NFR BLD AUTO: 1 % (ref 0–5)
EPI CELLS URNS QL MICRO: (no result) /LPF
ERYTHROCYTE [DISTWIDTH] IN BLOOD BY AUTOMATED COUNT: 15.2 % (ref 11.5–20)
GLOBULIN SER-MCNC: 4.2 GM/DL
GLUCOSE SERPL-MCNC: 145 MG/DL (ref 70–105)
GLUCOSE UR STRIP-MCNC: NEGATIVE MG/DL
HCT VFR BLD CALC: 39.3 % (ref 41–60)
HDLC SERPL-MCNC: 12 MG/DL (ref 23–92)
HGB BLD-MCNC: 12.7 GM/DL (ref 12–16)
INR PPP: 0.94 (ref 0.5–1.4)
KETONES UR STRIP-MCNC: NEGATIVE MG/DL
LEUKOCYTE ESTERASE UR-ACNC: (no result)
LIPASE SERPL-CCNC: 39 U/L (ref 11–82)
LYMPHOCYTE AB SER FC-ACNC: 0.4 TH/CMM (ref 1.5–3)
LYMPHOCYTES # BLD MANUAL: 7 % (ref 20–50)
LYMPHOCYTES NFR BLD AUTO: 3 % (ref 20–50)
MCH RBC QN AUTO: 27.2 PG (ref 26–30)
MCHC RBC AUTO-ENTMCNC: 32.4 PG (ref 28–36)
MCV RBC AUTO: 84.1 FL (ref 80–99)
MICRO URNS: YES
MONOCYTES # BLD AUTO: 1.3 TH/CMM (ref 0.3–1)
MONOCYTES # BLD MANUAL: 12 % (ref 2–10)
MONOCYTES NFR BLD AUTO: 10.8 % (ref 2–10)
NEUTROPHILS # BLD: 9.8 TH/CMM (ref 1.8–8)
NEUTROPHILS NFR BLD AUTO: 81 % (ref 40–80)
NEUTROPHILS NFR BLD AUTO: 84.7 % (ref 40–80)
NEUTS BAND NFR BLD: 0 % (ref 0–10)
NITRITE UR QL STRIP: POSITIVE
PH UR STRIP: 8.5 [PH] (ref 4.6–8)
PLATELET # BLD: 191 TH/CMM (ref 150–400)
PMV BLD AUTO: 6.7 FL
POTASSIUM SERPL-SCNC: 4.1 MEQ/L (ref 3.5–5.1)
PROT UR STRIP-MCNC: >=300 MG/DL
PROTHROMBIN TIME: 9.8 SECONDS (ref 9.5–11.5)
RBC # BLD AUTO: 4.68 MIL/CMM (ref 4.3–5.7)
RBC # UR STRIP: (no result) /UL
RBC #/AREA URNS HPF: (no result) /HPF (ref 0–5)
SODIUM SERPL-SCNC: 135 MEQ/L (ref 136–145)
SP GR UR STRIP: 1.02 (ref 1–1.03)
TRIGL SERPL-MCNC: 219 MG/DL (ref ?–150)
URINALYSIS COMPLETE PNL UR: (no result)
UROBILINOGEN UR STRIP-ACNC: 0.2 E.U./DL (ref 0.2–1)
WBC # BLD AUTO: 11.7 TH/CMM (ref 4.8–10.8)

## 2019-01-16 PROCEDURE — Z7610: HCPCS

## 2019-01-16 PROCEDURE — A9537 TC99M MEBROFENIN: HCPCS

## 2019-01-16 NOTE — ED PHYSICIAN CHART
ED Chief Complaint/HPI





- Patient Information


Date Seen:: 01/16/19


Time Seen:: 17:20


Chief Complaint:: Abdominal Pain


History of Present Illness:: 





onset x 2 days of N/V/D, Flank Pain, and Abdominal Pain; no report of LOC, ALOC

, AMS, H/As, S/T, neck pain, C/P, SOB, A/C, fever, chills, or urinary s/s


Allergies:: 


 Allergies











Allergy/AdvReac Type Severity Reaction Status Date / Time


 


docusate Allergy   Verified 01/12/17 23:45











Vitals:: 


 Vital Signs - 8 hr











  01/16/19





  17:23


 


Temp 99.1 F


 





 


RR 17


 


/103


 


O2 Sat % 97











Historian:: Patient, EMS


Review:: Nurse's Note Reviewed, Old Chart Reviewed, EMS run form Reviewed





ED Review of Systems





- Review of Systems


General/Constitutional: Fever, No chills, No weight loss, Weakness, No 

diaphoresis, No edema, No loss of appetite


Skin: No skin lesions, No rash, No bruising


Head: No headache, No light-headedness


Eyes: No loss of vision, No pain, No diplopia


ENT: No earache, No nasal drainage, No sore throat, No tinnitus


Neck: No neck pain, No swelling, No thyromegaly, No stiffness, No mass noted


Cardio Vascular: No chest pain, No palpitations, No PND, No orthopnea, No edema


Pulmonary: No SOB, No cough, No sputum, No wheezing


GI: Nausea, Vomiting, Diarrhea, Pain, No melena, No hematochezia, No 

constipation, No hematemesis


G/U: No dysuria, No frequency, No hematuria, No nacturia


Musculoskeletal: No bone or joint pain, No back pain, No muscle pain


Endocrine: No polyuria, No polydipsia


Psychiatric: No prior psych history, No depression, No anxiety, No suicidal 

ideation, No homicidal ideation, No auditory hallucination, No visual 

hallucination


Hematopoietic: No bruising, No lymphadenopathy


Allergic/Immuno: No urticaria, No angioedema


Neurological: No syncope, No focal symptoms, Weakness, No paresthesia, No 

headache, No seizure, No dizziness, Confusion, No vertigo





ED Past Medical History





- Past Medical History


Obtainable: Yes


Past Medical History: HTN, CVA/TIA, Dyslipidemia, PUD/GERD, Renal stone, 

Dementia, Other (CP; Osteoporosis; )


Family History: HTN


Social History: Non Smoker, No Alcohol, No Drug Use, Single, Care Facility


Surgical History: Pacemaker


Psychiatricy History: Dementia


Medication: Reviewed





Family Medical History





- Family Member


  ** Mother


History Unknown: Yes


Ethnicity: Unknown


Living Status: Unknown





ED Physical Exam





- Physical Examination


General/Constitutional: Awake, Well-developed, well-nourished, Alert, No 

distress, GCS 15, Non-toxic appearing, Ambulatory


Head: Atraumatic


Eyes: Lids, conjuctiva normal, PERRL, EOMI


Skin: Nl inspection, No rash, No skin lesions, No ecchymosis, Well hydrated, No 

lymphadenopathy


ENMT: External ears, nose nl, TM canals nl, Nasal exam nl, Lips, teeth, gums nl

, Oropharynx nl, Tonsils nl


Neck: Nontender, Full ROM w/o pain, No JVD, No nuchal rigidity, No bruit, No 

mass, No stridor


Respiratory: Nl effort/Exclusion, Clear to Auscultation, No Wheeze/Rhonchi/Rales


Cardio Vascular: RRR, No murmur, gallop, rubs, NL S1 S2, Carotid/Femoral/Distal 

pulses equal bilaterally


GI: No tenderness/rebounding/guarding, No organomegaly, No hernia, Normal BS's, 

Nondistended, No mass/bruits, No McBurney tenderness


Other GI comments:: 





no pulsatile masses


: No CVA tenderness


Extremities: No tenderness or effusion, Full ROM, normal strength in all 

extremities, No edema, Normal digits & nails


Neuro/Psych: DTR's symmetric, Normal sensory exam, Normal motor strength, 

Judgement/insight normal, Mood normal, Normal gait, No focal deficits


Other Neuro/Psych comments:: 





Disoriented and Confused


Misc: Normal back, No paraspinal tenderness





ED Labs/Radiology/EKG Results





- Lab Results


Comments:: 





Reviewed





- Radiology Results


Comments:: 





+ Hydronephrosis; Nephrolithiasis; Ureterolithiasis; Cholelithiasis/Gall Stones





- EKG Interpretations


EKG Time:: 19:23


Rate & Rhythm: 130; ST


Comments:: 





non-specific st-t changes





ED Septic Shock





- .


Is Septic Shock (SBP<90, OR Lactate>4 mmol\L) present?: No





- <6hrs of presentation:


Vital Signs: 


 Vital Signs - 8 hr











  01/16/19





  17:23


 


Temp 99.1 F


 





 


RR 17


 


/103


 


O2 Sat % 97














ED Reassessment (Disposition)





- Reassessment


Reassessment Condition:: Improved





- Diagnosis


Diagnosis:: 





Abdominal Pain; Flank Pain; Nephrolithiasis; Ureterolithiasis; Hydronephrosis: 

Cholelithiasis; Fever; Tachycardia; Dehydration; Hypertension; Leukocytosis; 

Hyponatremia; Acidosis; Hypoalbuminemia; Elevated D-Dimer; Elevated BNP; ESRD; 

Hyperlipidemia; UTI; Sepsis





- Aftercare/Follow up Instructions


Aftercare/Follow-Up Instructions:: Counseled pt regarding lab results/diagnosis 

& need follow up, Counseled pt & family regarding lab results/diagnosis & need 

follow up





- Patient Disposition


Discharge/Transfer:: Acute Care w/in this hosp


Accepting Physician:: Dr. Elias


Time Called:: 1915


Time Responded:: 19:15


Admitted to:: Telemetry


Spoke to:: Dr. Elias


Admitting Medical Physician:: Dr. Elias


Condition at Disposition:: Stable, Improved

## 2019-01-17 VITALS — SYSTOLIC BLOOD PRESSURE: 138 MMHG | DIASTOLIC BLOOD PRESSURE: 86 MMHG

## 2019-01-17 LAB
AMMONIA BLD-SCNC: 40 UMOL/L (ref 16–53)
ANION GAP SERPL CALC-SCNC: 15.1 MMOL/L (ref 7–16)
BUN SERPL-MCNC: 65 MG/DL (ref 7–25)
CALCIUM SERPL-MCNC: 8.8 MG/DL (ref 8.6–10.3)
CHLORIDE SERPL-SCNC: 107 MEQ/L (ref 98–107)
CO2 SERPL-SCNC: 20.9 MEQ/L (ref 21–31)
CREAT SERPL-MCNC: 5 MG/DL (ref 0.7–1.3)
EOSINOPHIL NFR BLD: 1 % (ref 0–5)
ERYTHROCYTE [DISTWIDTH] IN BLOOD BY AUTOMATED COUNT: 16 % (ref 11.5–20)
GLUCOSE SERPL-MCNC: 150 MG/DL (ref 70–105)
HCT VFR BLD CALC: 35.7 % (ref 41–60)
HGB BLD-MCNC: 11.6 GM/DL (ref 12–16)
LYMPHOCYTES # BLD MANUAL: 7 % (ref 20–50)
MCH RBC QN AUTO: 27.1 PG (ref 26–30)
MCHC RBC AUTO-ENTMCNC: 32.5 PG (ref 28–36)
MCV RBC AUTO: 83.5 FL (ref 80–99)
MONOCYTES # BLD MANUAL: 7 % (ref 2–10)
NEUTROPHILS NFR BLD AUTO: 84 % (ref 40–80)
NEUTS BAND NFR BLD: 1 % (ref 0–10)
PLATELET # BLD EST: ADEQUATE 10*3/UL
PLATELET # BLD: 163 TH/CMM (ref 150–400)
PMV BLD AUTO: 6.9 FL
POTASSIUM SERPL-SCNC: 4 MEQ/L (ref 3.5–5.1)
RBC # BLD AUTO: 4.27 MIL/CMM (ref 4.3–5.7)
SODIUM SERPL-SCNC: 139 MEQ/L (ref 136–145)
WBC # BLD AUTO: 9 TH/CMM (ref 4.8–10.8)

## 2019-01-17 RX ADMIN — SODIUM CHLORIDE SCH MLS/HR: 9 INJECTION, SOLUTION INTRAVENOUS at 20:51

## 2019-01-17 RX ADMIN — INSULIN ASPART SCH UNITS: 100 INJECTION, SOLUTION INTRAVENOUS; SUBCUTANEOUS at 17:39

## 2019-01-17 RX ADMIN — INSULIN ASPART SCH UNITS: 100 INJECTION, SOLUTION INTRAVENOUS; SUBCUTANEOUS at 12:16

## 2019-01-17 RX ADMIN — DEXTROSE AND SODIUM CHLORIDE SCH MLS/HR: 5; .9 INJECTION, SOLUTION INTRAVENOUS at 10:59

## 2019-01-17 RX ADMIN — DEXTROSE AND SODIUM CHLORIDE SCH MLS/HR: 5; .9 INJECTION, SOLUTION INTRAVENOUS at 12:18

## 2019-01-17 RX ADMIN — SODIUM CHLORIDE SCH MLS/HR: 9 INJECTION, SOLUTION INTRAVENOUS at 12:18

## 2019-01-17 RX ADMIN — DEXTROSE AND SODIUM CHLORIDE SCH MLS/HR: 5; .9 INJECTION, SOLUTION INTRAVENOUS at 02:31

## 2019-01-17 RX ADMIN — HYDROCODONE BITARTRATE AND ACETAMINOPHEN PRN TAB: 5; 325 TABLET ORAL at 09:53

## 2019-01-17 RX ADMIN — SODIUM CHLORIDE SCH MLS/HR: 9 INJECTION, SOLUTION INTRAVENOUS at 05:00

## 2019-01-17 RX ADMIN — Medication SCH TAB: at 09:50

## 2019-01-17 RX ADMIN — INSULIN ASPART SCH: 100 INJECTION, SOLUTION INTRAVENOUS; SUBCUTANEOUS at 06:38

## 2019-01-17 RX ADMIN — INSULIN ASPART SCH: 100 INJECTION, SOLUTION INTRAVENOUS; SUBCUTANEOUS at 21:02

## 2019-01-17 NOTE — HISTORY & PHYSICAL
ADMIT DATE:  01/16/2019



CHIEF COMPLAINT:  Abdominal pain.



HISTORY OF PRESENT ILLNESS:  This is a 48-year-old  male with history

of kidney stone, cerebral palsy, mental retardation, spastic quadriplegia,

hypertension, cardiomyopathy, and G-tube secondary to dysphagia, admitted

secondary to history of abdominal discomfort with nausea and vomiting.  The

patient was evaluated in the ER, found to be in acute renal failure with

elevated white count and with elevated heart rate as well.  The patient is

admitted for further management.  The patient is transferred from ____.



PAST MEDICAL HISTORY:  As mentioned in the history of present illness.



PAST SURGICAL HISTORY:  Status post G-tube ____.



ALLERGIES:  SULFA.



MEDICATIONS:  Tylenol, amoxicillin, ____, Norco, and multivitamins.  At one

time, the patient was on Macrobid, MiraLax, rosuvastatin, tramadol, baclofen,

metoprolol, and multivitamins.



FAMILY HISTORY:  Noncontributory.



SOCIAL HISTORY:  The patient lives in a nursing home.  The patient is requiring

24-hour total care.



REVIEW OF SYSTEMS:  This is limited secondary to the patient's current mental

state.  We will try to obtain more detailed review of systems by talking to

family members, also we will try to get more information from the nursing staff

at [facility], [phone].



PHYSICAL EXAMINATION:

VITAL SIGNS:  Blood pressure 142/88, respirations 20, pulse 114, temperature

100.4.

GENERAL:  Elderly male, contracted, chronically ill.

NECK:  Supple.

LUNGS:  Equal breath sounds with a few rhonchi.

HEART:  Regular rate and rhythm.

HEART:  Sinus tachycardia.  Unable to appreciate any murmurs.

ABDOMEN:  Soft, globular.  Positive G-tube.

EXTREMITIES:  Positive excoriation and contractures.

NEUROLOGIC:  Limited.



LABORATORY DATA:  WBC 11, hemoglobin 12, platelets 191.  D-dimer is elevated at

3700.  Sodium 135, potassium 4.1, BUN 6, creatinine 5.1, blood sugar is 145,

bicarb is 21, alkaline phosphatase 72.  BNP is 142.  Albumin is 3.2.  UA with 25

WBC, 1+ bacteria, moderate leukocytes.



ASSESSMENT AND PLAN:  Urinary tract infection, sepsis, acute renal failure,

kidney stones, leukocytosis, metabolic acidosis, low albumin, cerebral palsy,

mental retardation, spastic quadriplegia, hypertension, cardiomyopathy,

hypercholesterolemia, status post PEG, elevated D-dimer.  Continue the patient

on oxygen and bronchodilator treatment.  Continue aggressive IV hydration. 

Continue on broad-spectrum antibiotic.  We will review the patient's CT of the

abdomen and pelvis.  We will start the patient on heparin prophylactically.  We

will titrate the patient's antihypertensive medication.  We may consider a

Urology consultation.  We will continue to monitor the patient on the telemetry

bed.





DD: 01/16/2019 23:41

DT: 01/17/2019 00:17

JOB# [id number]  [id number]

## 2019-01-17 NOTE — DIAGNOSTIC IMAGING REPORT
CT Chest without IV contrast



HISTORY: Elevated d-dimer, due to patient's elevated creatinine levels,

IV contrast was not administered.  Per emergency room doctor request, CT

chest without IV contrast was performed



COMPARISON: Chest x-ray and CT abdomen and pelvis performed the same

day.



Technique: Axial images were obtained from the base of the neck to the

upper abdomen without administration of IV contrast. Coronal

reconstructions were made.  Total , CTD I 11.4



Findings:



Evaluation of the mediastinum is limited due to lack of IV contrast.  No

evidence of mediastinal lymphadenopathy.  Heart size normal.  No

evidence of an aneurysm.  No pericardial effusion.



Evaluation of the lungs demonstrates hypoventilatory and atelectatic

lung changes with minimal bibasal consolidative and passive atelectatic

changes, right greater than left.  No pleural effusions.



The osseous structures demonstrate no acute abnormalities.



IMPRESSION:



Hypoventilatory and atelectatic lung changes with minimal bibasal

consolidative changes and right basal passive atelectasis.





Note due to lack of IV contrast assessment for pulmonary embolus was not

able to be performed.  As CT chest PE study was not able to be performed

due to patient's elevated creatinine, nuclear medicine VQ scan may be

obtained for further assessment if there is continued concern for

pulmonary embolus.

## 2019-01-17 NOTE — DIAGNOSTIC IMAGING REPORT
CT abdomen and pelvis without intravenous contrast



Indication: Abdominal pain



Comparison: CT abdomen and pelvis on 10/14/2015,



Technique: Axial images were obtained from the lung bases to the

bilateral proximal femurs without IV contrast.  Coronal reconstructions

were made.  total DLP: 725,  CTDI13.5



FINDINGS:



Evaluation of the solid organs is limited due to lack of IV contrast. 

Exam is also limited due to motion.  No focal hepatic lesions. 

Gallstones are noted.  No focal splenic lesions.  Calcification seen

along the tail of the pancreatic region are probably related to splenic

artery calcifications or chronic pancreatitis.  No evidence of

inflammatory changes surrounding the pancreas



There is enlargement of the right kidney with numerous very large renal

stones measuring up to 3 cm.  There is severe right hydronephrosis with

large stone seen along the collecting system including just proximal to

the right ureteropelvic junction measuring up to 2 cm.  There is also an

additional 1.5 cm stone within the proximal one third of the right

ureter.  No stones seen distal to this point.



Mild left renal atrophy is noted with moderate left hydronephrosis and

numerous left renal stones including largest stones measuring 2 cm on

the upper pole.  There are also large stones seen along the

ureteropelvic junction measuring up to 2.5 cm in combined dimension. 

There are also multiple adjacent stones seen within the mid to distal

left ureter with largest stone measuring 1.3 cm.



Left renal 2.6 cm cyst is noted.  Left renal high density lesions are

also noted measuring up to 1.1 cm which may present hemorrhagic cysts. 

Underdistended urinary bladder is seen with small stones seen along the

posterior aspect.  Mild urinary bladder wall thickening is noted. 

Mildly prominent prostate gland is seen with prostate gland

calcifications.



Moderate-stool is noted in the colon with marked air-filled loops of

bowel seen along the anterior abdomen.  Diverticulosis is noted without

definite evidence of diverticulitis.  Appendix is not well-visualized.



Right perinephric inflammatory changes are seen extending along

inferiorly along the right retroperitoneum with trace surrounding fluid.

 



No evidence of the free abdominal fluid or free abdominal air.  Mild

atherosclerosis is noted.  Degenerative changes spine are noted with

scoliosis.



IMPRESSION:



Enlarged right kidney with numerous large stones.  There are also large

stones within the right renal collecting system just proximal to the

ureteropelvic junction measuring up to 2 cm.  There is also an

additional 1.5 cm stone within the proximal one third of the right

ureter.  Associated moderate to severe right hydronephrosis is noted

with enlargement of right kidney and right perinephric inflammatory

changes and trace right perinephric fluid.



Additional numerous left-sided renal stones with left renal atrophy. 

There are also multiple stones seen along the left UPJ measuring up to

2.5 cm in combined length.  There are additional numerous adjacent

stones seen within the left mid to distal ureter the largest measuring

up to 1.3 cm.  There is moderate left hydronephrosis which is probably

chronic.



Additional small stones within the urinary bladder mild urine bladder

wall thickening.



Left renal cysts and high density left renal lesions probably represent

hemorrhagic cysts, ultrasound would further clarify



Mildly prominent prostate gland



Moderate stool with multiple gas-filled loops of bowel which may be due

to mild ileus.



Gallstones.



Diverticulosis.



Mild atherosclerosis.



Please refer to above for details.

## 2019-01-17 NOTE — CONSULTATION
DATE OF CONSULTATION:  



UROLOGY CONSULT



REASON FOR CONSULTATION:  Seen for bilateral kidney stones, bilateral ureteral

stones, bilateral hydronephrosis, renal failure and sepsis.



HISTORY OF PRESENT ILLNESS:  The patient is a 48-year-old male, resident of a

nursing home secondary to cerebral palsy versus stroke and functional

quadriplegic.  He also has mental retardation by history.  He was admitted with

nausea, vomiting, fever, diarrhea and abdominal pain.  He was found to have

elevated BUN and creatinine and white count and bilateral stones with

hydronephrosis.  The patient is unable to provide any history since he is

nonverbal.



PAST MEDICAL HISTORY:  Consists of stone disease.  He also has hypertension and

cardiomyopathy.  He has had a pacemaker placed.  He has hypercholesteremia.  He

used to have a G-tube that has now been removed.



ALLERGIES:  SULFA.



PAST SURGICAL HISTORY:  G-tube placement and then subsequent removal most

likely.



HOME MEDICATIONS:  Tylenol, Dulcolax, calcium supplement, vitamin D3 supplement,

enemas as needed, Vicodin _____ as needed, nitrofurantoin seems to be

prophylactic 100 daily, tramadol, baclofen, Pepcid, magnesium hydroxide,

metoprolol.



REVIEW OF SYSTEMS:  He has come in with history of fever.  We do not know about

weight loss.  He has had vomiting, diarrhea and abdominal pain.  No chest pain,

coughing, shortness of breath.  There is no record of seizures or headaches.  No

sore throat or vision changes that we can determine easily.  We do not know

about urinary symptoms since he is unable to talk and he has a Zhao catheter. 

Again, nobody knows if it was placed in the ER or he came with it.  He has

spastic quadriplegia with contractures in the lower extremities at the knees and

abducted hips.



PHYSICAL EXAMINATION:

GENERAL:  On exam, he is awake but he cannot communicate.

VITAL SIGNS:  His temperature is 98.4, heart rate 113, blood pressure 137/86,

fever recorded yesterday at 100.4 at one time.

HEAD AND NECK:  Normocephalic.  Trachea central.  Pupils equal and reactive.  No

jaundice.  Thyroid and lymph nodes not palpable.  Carotid bruit absent.

CHEST:  Symmetrical.

LUNGS:  Clear.  No rales or rhonchi.

HEART:  Sounds normal in sinus rhythm.  No murmur.

ABDOMEN:  Soft, generalized mild tenderness.  No guarding, rebound or rigidity. 

Both flanks mildly tender to percussion.

GENITALIA:  Normal male.  Zhao catheter draining scant amount of urine but

clear.

EXTREMITIES:  Abducted at the hips.  Flexed at the knees.  No pedal edema or

lymphadenopathy.

NEUROLOGIC:  Quadriplegic.



LABORATORY AND DIAGNOSTIC DATA:  White count was 11.7 yesterday, today it is 9;

hemoglobin 11.6, normal platelets.  There was one band today and a left shift to

the white count.  PT and PTT are normal.  D-dimer elevated to 3700.  Sodium 139,

potassium 4, CO2 20.9, BUN 65, creatinine 5.0.  Liver functions mostly normal,

mildly elevated alkaline phosphatase.  Urine showed moderate amount of blood and

nitrites, moderate amount of leukocyte esterase, 10-25 white cells, no

significant red cells or bacteria.  Cultures are pending.  CT scan of the

abdomen and pelvis shows multiple stones in both kidneys around the UPJ, they

are large and up to 2 cm and another one in the right ureter, 1.5 cm stone in

the proximal one-third of the right ureter causing the hydronephrosis, severe

and perinephric inflammatory changes.  On the left side, there is atrophy of the

kidney, multiple stones around the UPJ measuring up to 2.5 cm and numerous

stones in the left mid to distal ureter up to 1.3 cm with moderate

hydronephrosis.  There are small stones in the urinary bladder as well.  There

were gallstones and diverticulosis.  Chest x-ray shows no acute changes.  Chest

CT shows hypoventilation but cannot comment on the possibility of pulmonary

embolus.  There is no mention of a pacemaker, so that is an inaccurate history 

most likely.



IMPRESSION:  The patient has:

1.  Bilateral multiple stones with left atrophic kidney and multiple stones in

the left ureter and a big one in the right ureter, all of which will require

multiple operations to get him stone free.  With his medical history, I do not

think this is humane and practical recommendation and I would personally

recommend treating sepsis as well as possible and allow nature to take its

course and the renal failure to progress unless the family wants to have him go

through multiple operations including starting with nephrostomies and then

ureteroscopies and multiple ESWLs, etc. to get him stone free.  This would have

to be done at a tertiary center or [facility] since the stone burden is

extremely large.  Given his medical condition, I am not sure if this is even

feasible or the right thing to do.  A palliative measure would be to put

nephrostomies in both kidneys, which would temporize the situation, control the

sepsis and improve the renal failure to some extent.  Then, we will be faced

with ongoing care of the nephrostomies, which tend to fall out very easily and

need frequent replacements and more invasive procedures.

2.  Quadriplegic secondary to cerebral palsy or stroke, details unavailable,

bedridden and noncommunicable.

3.  The patient is able to tolerate and take his own oral diet, which is a

positive indicator, but he is unable to take a lot of fluids and therefore we

have kept a small amount of IV fluids going so that we hydrate him, but to not

push him into congestive heart failure.





DD: 01/17/2019 11:55

DT: 01/17/2019 15:46

JOB# [id number]  [id number]

## 2019-01-17 NOTE — INTERNAL MEDICINE PROG NOTE
Internal Medicine Subjective





- Subjective


Patient seen and examined:: with staff, chart reviewed


Patient is:: awake, non-verbal, non-interactive, in bed


Per staff patient has:: no adverse event, no episodes of fall, tolerating meds





Internal Medicine Objective





- Results


Result Diagrams: 


 01/17/19 06:22





 01/17/19 06:22


Recent Labs: 


 Laboratory Last Values











WBC  9.0 Th/cmm (4.8-10.8)   01/17/19  06:22    


 


RBC  4.27 Mil/cmm (4.30-5.70)  L  01/17/19  06:22    


 


Hgb  11.6 gm/dL (12-16)  L  01/17/19  06:22    


 


Hct  35.7 % (41.0-60)  L  01/17/19  06:22    


 


MCV  83.5 fl (80-99)   01/17/19  06:22    


 


MCH  27.1 pg (26.0-30.0)   01/17/19  06:22    


 


MCHC Differential  32.5 pg (28.0-36.0)   01/17/19  06:22    


 


RDW  16.0 % (11.5-20.0)   01/17/19  06:22    


 


Plt Count  163 Th/cmm (150-400)   01/17/19  06:22    


 


MPV  6.9 fl  01/17/19  06:22    


 


Add Manual Diff  YES   01/17/19  06:22    


 


Neutrophils %  84.7 % (40.0-80.0)  H  01/16/19  19:00    


 


Band Neutrophils %  1 % (0-10)   01/17/19  06:22    


 


Lymphocytes %  3.0 % (20.0-50.0)  L  01/16/19  19:00    


 


Monocytes %  10.8 % (2.0-10.0)  H  01/16/19  19:00    


 


Eosinophils %  1.0 % (0.0-5.0)   01/16/19  19:00    


 


Basophils %  0.5 % (0.0-2.0)   01/16/19  19:00    


 


Neutrophils (Manual)  84 % (40-80)  H  01/17/19  06:22    


 


Lymphocytes  7 % (20-50)  L  01/17/19  06:22    


 


Monocytes  7 % (2-10)   01/17/19  06:22    


 


Eosinophils  1 % (0-5)   01/17/19  06:22    


 


Platelet Estimate  ADEQUATE  (NORMAL)   01/17/19  06:22    


 


PT  9.8 SECONDS (9.5-11.5)   01/16/19  19:00    


 


INR  0.94  (0.5-1.4)   01/16/19  19:00    


 


PTT (Actin FS)  31.0 SECONDS (26.0-38.0)   01/16/19  19:00    


 


D-Dimer  3700 ng/mL (100-400)  H  01/16/19  19:00    


 


Sodium  139 mEq/L (136-145)   01/17/19  06:22    


 


Potassium  4.0 mEq/L (3.5-5.1)   01/17/19  06:22    


 


Chloride  107 mEq/L ()   01/17/19  06:22    


 


Carbon Dioxide  20.9 mEq/L (21.0-31.0)  L  01/17/19  06:22    


 


Anion Gap  15.1  (7.0-16.0)   01/17/19  06:22    


 


BUN  65 mg/dL (7-25)  H  01/17/19  06:22    


 


Creatinine  5.0 mg/dL (0.7-1.3)  H*  01/17/19  06:22    


 


Est GFR (African Amer)  15.9 ml/min (>90)   01/17/19  06:22    


 


Est GFR (Non-Af Amer)  13.2 ml/min  01/17/19  06:22    


 


BUN/Creatinine Ratio  13.0   01/17/19  06:22    


 


Glucose  150 mg/dL ()  H  01/17/19  06:22    


 


POC Glucose  222 MG/DL (70 - 105)  H  01/17/19  12:01    


 


Whole Bld Lactic Acid  0.59 mmol/L (0.60-1.99)  L  01/16/19  19:00    


 


Calcium  8.8 mg/dL (8.6-10.3)   01/17/19  06:22    


 


Total Bilirubin  0.4 mg/dL (0.3-1.0)   01/16/19  19:00    


 


AST  18 U/L (13-39)   01/16/19  19:00    


 


ALT  20 U/L (7-52)   01/16/19  19:00    


 


Alkaline Phosphatase  172 U/L ()  H  01/16/19  19:00    


 


Ammonia  40 umol/L (16-53)   01/17/19  06:22    


 


Creatine Kinase  85 U/L ()   01/16/19  19:00    


 


Troponin I  0.02 ng/mL (0.01-0.05)   01/16/19  19:00    


 


B-Natriuretic Peptide  142.0 pg/mL (5.0-100.0)  H  01/16/19  19:00    


 


Total Protein  7.4 gm/dL (6.0-8.3)   01/16/19  19:00    


 


Albumin  3.2 gm/dL (4.2-5.5)  L  01/16/19  19:00    


 


Globulin  4.2 gm/dL  01/16/19  19:00    


 


Albumin/Globulin Ratio  0.8  (1.0-1.8)  L  01/16/19  19:00    


 


Triglycerides  219 mg/dL (<150)  H  01/16/19  19:00    


 


Cholesterol  73 mg/dL (<200)   01/16/19  19:00    


 


LDL Cholesterol Direct  18 mg/dL ()  L  01/16/19  19:00    


 


HDL Cholesterol  12 mg/dL (23-92)  L  01/16/19  19:00    


 


Amylase  50 U/L ()   01/16/19  19:00    


 


Lipase  39 U/L (11-82)   01/16/19  19:00    


 


TSH  0.89 uIU/ml (0.34-5.60)   01/17/19  06:22    


 


Urine Source  CLEAN C   01/16/19  21:26    


 


Urine Color  YELLOW   01/16/19  21:26    


 


Urine Clarity  HAZY  (CLEAR)   01/16/19  21:26    


 


Urine pH  8.5  (4.6 - 8.0)   01/16/19  21:26    


 


Ur Specific Gravity  1.020  (1.005-1.030)   01/16/19  21:26    


 


Urine Protein  >=300 mg/dL (NEGATIVE)   01/16/19  21:26    


 


Urine Glucose (UA)  NEGATIVE mg/dL (NEGATIVE)   01/16/19  21:26    


 


Urine Ketones  NEGATIVE mg/dL (NEGATIVE)   01/16/19  21:26    


 


Urine Blood  MODERATE  (NEGATIVE)  H  01/16/19  21:26    


 


Urine Nitrate  POSITIVE  (NEGATIVE)  H  01/16/19  21:26    


 


Urine Bilirubin  NEGATIVE  (NEGATIVE)   01/16/19  21:26    


 


Urine Urobilinogen  0.2 E.U./dL (0.2 - 1.0)   01/16/19  21:26    


 


Ur Leukocyte Esterase  MODERATE  (NEGATIVE)  H  01/16/19  21:26    


 


Urine RBC  2-5 /hpf (0-5)  H  01/16/19  21:26    


 


Urine WBC  10-25 /hpf (0-5)  H  01/16/19  21:26    


 


Ur Epithelial Cells  FEW /lpf (FEW)   01/16/19  21:26    


 


Urine Bacteria  1+ /hpf (NONE SEEN)  H  01/16/19  21:26    














- Physical Exam


Vitals and I&O: 


 Vital Signs











Temp  98.4 F   01/17/19 11:35


 


Pulse  113   01/17/19 11:35


 


Resp  20   01/17/19 11:35


 


BP  137/86   01/17/19 11:35


 


Pulse Ox  98   01/17/19 11:35








 Intake & Output











 01/16/19 01/17/19 01/17/19





 18:59 06:59 18:59


 


Intake Total  160 1000


 


Output Total  300 


 


Balance  -140 1000


 


Weight (lbs) 60.781 kg 61.28 kg 


 


Intake:   


 


  Intake, IV Amount  100 1000


 


    D5-0.9%Ns 1,000 ml @ 125   1000





    mls/hr IV .Q8H [facility] Rx#:   





    [id number]   


 


    Piperacillin Sodium/  100 





    Tazobact 2.25 gm In   





    Sodium Chloride 0.9% 100   





    ml @ 100 mls/hr IV Q8HR   





    [facility] Rx#:[id number]   


 


  Oral  60 


 


Output:   


 


  Urine  300 


 


Other:   


 


  Weight Source Standing scale Bedscale 











Active Medications: 


Current Medications





Acetaminophen (Tylenol 650mg Supp)  650 mg RC Q4HR PRN


   PRN Reason: MILD PAIN OR TEMP >100


   Stop: 03/17/19 23:21


Acetaminophen/Hydrocodone Bitart (Norco 5mg/325mg)  1 tab PO Q4H PRN


   PRN Reason: MODERATE PAIN (LEVEL 4-6)


   Stop: 03/17/19 23:21


   Last Admin: 01/17/19 09:53 Dose:  1 tab


Albuterol Sulfate (Albuterol 2.5mg/3ml Neb Ud)  2.5 mg HHN Q2HRT PRN


   PRN Reason: Shortness of Breath or Wheeze


   Stop: 03/17/19 23:25


Baclofen (Lioresal)  10 mg PO QID [facility]


   Stop: 03/18/19 08:59


   Last Admin: 01/17/19 12:18 Dose:  10 mg


Famotidine (Pepcid)  20 mg PO DAILY@0730 [facility]


   Stop: 03/18/19 07:29


   Last Admin: 01/17/19 06:39 Dose:  20 mg


Heparin Sodium (Porcine) (Heparin)  5,000 units SUBQ Q12HR [facility]


   Stop: 03/18/19 08:59


   Last Admin: 01/17/19 09:52 Dose:  5,000 units


Piperacillin Sod/Tazobactam (Sod 2.25 gm/ Sodium Chloride)  100 mls @ 100 mls/

hr IV Q8HR [facility]


   Stop: 03/18/19 04:59


   Last Admin: 01/17/19 12:18 Dose:  100 mls/hr


Dextrose/Sodium Chloride (D5-0.9%Ns)  1,000 mls @ 40 mls/hr IV .Q24H [facility]


   Stop: 03/18/19 11:44


   Last Admin: 01/17/19 12:18 Dose:  40 mls/hr


Insulin Aspart (Novolog)  0 units SUBQ ACHS [facility]; Protocol


   Stop: 03/18/19 07:29


   Last Admin: 01/17/19 12:16 Dose:  2 units


Loperamide HCl (Imodium)  4 mg PO PRN PRN


   PRN Reason: Loose Stools


   Stop: 03/17/19 23:21


Magnesium Hydroxide (Milk Of Magnesia)  30 ml PO Q72H PRN


   PRN Reason: Constipation


   Stop: 03/17/19 23:21


Metoprolol Tartrate (Lopressor)  50 mg PO BID [facility]


   Stop: 03/18/19 08:59


   Last Admin: 01/17/19 09:50 Dose:  50 mg


Ondansetron HCl (Zofran)  4 mg IV Q8H PRN


   PRN Reason: Nausea / Vomiting


   Stop: 03/17/19 23:26


Sodium Phosphate (Fleet Enema)  135 ml RC PRN PRN


   PRN Reason: IF DULCOLAX INEFFECTIVE


   Stop: 03/17/19 23:21








General: congested, demented


HEENT: NC/AT, PERRLA, EOMI


Neck: Supple, No JVD, No thyromegaly


Lungs: congested, rales, ronchi


Cardiovascular: RRR, Normal S1, Normal S2


Abdomen: soft, non-tender, globular, +GT


Extremities: excoriation, contracture





- Procedures


Procedures: 


 Procedures











Procedure Code Date


 


INSERT INDWELLING CATH 57.94 10/16/08


 


INSERT TEMP BLADDER CATH 23634 10/16/08














Internal Medicine Assmt/Plan





- Assessment


Assessment: 





ASSESSMENT AND PLAN:  Urinary tract infection, sepsis, acute renal failure,


kidney stones, leukocytosis, metabolic acidosis, low albumin, cerebral palsy,


mental retardation, spastic quadriplegia, hypertension, cardiomyopathy,


hypercholesterolemia, status post PEG, elevated D-dimer.





- Plan


Plan: 





 PLAN:   Continue the patient


on oxygen and bronchodilator treatment.  Continue aggressive IV hydration. 


Continue on broad-spectrum antibiotic.  We will review the patient's CT of the


abdomen and pelvis.  We will start the patient on heparin prophylactically.  We


will titrate the patient's antihypertensive medication.  We may consider a


Urology consultation.  We will continue to monitor the patient on the telemetry


bed.


will refer to dr spears

## 2019-01-18 LAB
ANION GAP SERPL CALC-SCNC: 15.1 MMOL/L (ref 7–16)
BASOPHILS # BLD AUTO: 0.1 TH/CUMM (ref 0–0.2)
BASOPHILS NFR BLD: 0 % (ref 0–3)
BASOPHILS NFR BLD: 0 % (ref 0–3)
BUN SERPL-MCNC: 67 MG/DL (ref 7–25)
CALCIUM SERPL-MCNC: 8.7 MG/DL (ref 8.6–10.3)
CHLORIDE SERPL-SCNC: 111 MEQ/L (ref 98–107)
CO2 SERPL-SCNC: 19 MEQ/L (ref 21–31)
EOSINOPHIL # BLD AUTO: 0.1 TH/CMM (ref 0.1–0.4)
EOSINOPHIL NFR BLD: 0 % (ref 0–5)
EOSINOPHIL NFR BLD: 1 % (ref 0–5)
ERYTHROCYTE [DISTWIDTH] IN BLOOD BY AUTOMATED COUNT: 15.9 % (ref 11.5–20)
ERYTHROCYTE [DISTWIDTH] IN BLOOD BY AUTOMATED COUNT: 16.3 % (ref 11.5–20)
FOLATE SERPL-MCNC: >20 NG/ML (ref 3–?)
GLUCOSE SERPL-MCNC: 153 MG/DL (ref 70–105)
HCT VFR BLD CALC: 35.3 % (ref 41–60)
HCT VFR BLD CALC: 35.8 % (ref 41–60)
HGB BLD-MCNC: 11.7 GM/DL (ref 12–16)
HGB BLD-MCNC: 11.8 GM/DL (ref 12–16)
LYMPHOCYTE AB SER FC-ACNC: 0.4 TH/CMM (ref 1.5–3)
LYMPHOCYTES # BLD MANUAL: 2 % (ref 20–50)
LYMPHOCYTES # BLD MANUAL: 6 % (ref 20–50)
MCH RBC QN AUTO: 27.3 PG (ref 26–30)
MCH RBC QN AUTO: 27.4 PG (ref 26–30)
MCHC RBC AUTO-ENTMCNC: 33 PG (ref 28–36)
MCHC RBC AUTO-ENTMCNC: 33.2 PG (ref 28–36)
MCV RBC AUTO: 82.3 FL (ref 80–99)
MCV RBC AUTO: 82.9 FL (ref 80–99)
MONOCYTES # BLD AUTO: 0.4 TH/CMM (ref 0.3–1)
MONOCYTES # BLD MANUAL: 2 % (ref 2–10)
MONOCYTES # BLD MANUAL: 3 % (ref 2–10)
NEUTROPHILS # BLD: 16.2 TH/CMM (ref 1.8–8)
NEUTROPHILS NFR BLD AUTO: 91 % (ref 40–80)
NEUTROPHILS NFR BLD AUTO: 95 % (ref 40–80)
NEUTS BAND NFR BLD: 0 % (ref 0–10)
NEUTS BAND NFR BLD: 0 % (ref 0–10)
PLATELET # BLD: 210 TH/CMM (ref 150–400)
PLATELET # BLD: 212 TH/CMM (ref 150–400)
PMV BLD AUTO: 6.4 FL
PMV BLD AUTO: 6.5 FL
POTASSIUM SERPL-SCNC: 4.1 MEQ/L (ref 3.5–5.1)
RBC # BLD AUTO: 4.28 MIL/CMM (ref 4.3–5.7)
RBC # BLD AUTO: 4.32 MIL/CMM (ref 4.3–5.7)
SODIUM SERPL-SCNC: 141 MEQ/L (ref 136–145)
VIT B12 SERPL-MCNC: 569 PG/ML (ref 232–1245)
WBC # BLD AUTO: 14.5 TH/CMM (ref 4.8–10.8)
WBC # BLD AUTO: 17.2 TH/CMM (ref 4.8–10.8)

## 2019-01-18 RX ADMIN — INSULIN ASPART SCH UNITS: 100 INJECTION, SOLUTION INTRAVENOUS; SUBCUTANEOUS at 12:15

## 2019-01-18 RX ADMIN — Medication SCH TAB: at 08:35

## 2019-01-18 RX ADMIN — Medication SCH EACH: at 17:20

## 2019-01-18 RX ADMIN — HYDROCODONE BITARTRATE AND ACETAMINOPHEN PRN TAB: 5; 325 TABLET ORAL at 08:35

## 2019-01-18 RX ADMIN — SODIUM CHLORIDE SCH MLS/HR: 9 INJECTION, SOLUTION INTRAVENOUS at 12:15

## 2019-01-18 RX ADMIN — SODIUM CHLORIDE SCH MLS/HR: 9 INJECTION, SOLUTION INTRAVENOUS at 21:02

## 2019-01-18 RX ADMIN — SODIUM CHLORIDE SCH MLS/HR: 9 INJECTION, SOLUTION INTRAVENOUS at 05:04

## 2019-01-18 RX ADMIN — INSULIN ASPART SCH: 100 INJECTION, SOLUTION INTRAVENOUS; SUBCUTANEOUS at 06:39

## 2019-01-18 RX ADMIN — INSULIN ASPART SCH: 100 INJECTION, SOLUTION INTRAVENOUS; SUBCUTANEOUS at 21:27

## 2019-01-18 RX ADMIN — INSULIN ASPART SCH UNITS: 100 INJECTION, SOLUTION INTRAVENOUS; SUBCUTANEOUS at 17:22

## 2019-01-18 RX ADMIN — DEXTROSE AND SODIUM CHLORIDE SCH MLS/HR: 5; .9 INJECTION, SOLUTION INTRAVENOUS at 11:34

## 2019-01-18 RX ADMIN — HYDROCODONE BITARTRATE AND ACETAMINOPHEN PRN TAB: 5; 325 TABLET ORAL at 14:06

## 2019-01-18 NOTE — INTERNAL MEDICINE PROG NOTE
Internal Medicine Subjective





- Subjective


Patient seen and examined:: with staff, chart reviewed


Patient is:: awake, non-verbal, non-interactive, in bed


Patient Complaints of:: other (per staff, abd distended)


Per staff patient has:: no adverse event, no episodes of fall, tolerating meds





Internal Medicine Objective





- Results


Result Diagrams: 


 19 06:46





 19 06:46


Recent Labs: 


 Laboratory Last Values











WBC  14.5 Th/cmm (4.8-10.8)  H  19  06:46    


 


RBC  4.32 Mil/cmm (4.30-5.70)   19  06:46    


 


Hgb  11.8 gm/dL (12-16)  L  19  06:46    


 


Hct  35.8 % (41.0-60)  L  19  06:46    


 


MCV  82.9 fl (80-99)   19  06:46    


 


MCH  27.4 pg (26.0-30.0)   19  06:46    


 


MCHC Differential  33.0 pg (28.0-36.0)   19  06:46    


 


RDW  15.9 % (11.5-20.0)   19  06:46    


 


Plt Count  210 Th/cmm (150-400)   19  06:46    


 


MPV  6.4 fl  19  06:46    


 


Add Manual Diff  YES   19  06:46    


 


Neutrophils %  84.7 % (40.0-80.0)  H  19  19:00    


 


Band Neutrophils %  0 % (0-10)   19  06:46    


 


Lymphocytes %  3.0 % (20.0-50.0)  L  19  19:00    


 


Monocytes %  10.8 % (2.0-10.0)  H  19  19:00    


 


Eosinophils %  1.0 % (0.0-5.0)   19  19:00    


 


Basophils %  0.5 % (0.0-2.0)   19  19:00    


 


Neutrophils (Manual)  91 % (40-80)  H  19  06:46    


 


Lymphocytes  6 % (20-50)  L  19  06:46    


 


Monocytes  3 % (2-10)   19  06:46    


 


Eosinophils  0 % (0-5)   19  06:46    


 


Basophils  0 % (0-3)   19  06:46    


 


Platelet Estimate  ADEQUATE  (NORMAL)   19  06:22    


 


PT  9.8 SECONDS (9.5-11.5)   19  19:00    


 


INR  0.94  (0.5-1.4)   19  19:00    


 


PTT (Actin FS)  31.0 SECONDS (26.0-38.0)   19  19:00    


 


D-Dimer  3700 ng/mL (100-400)  H  19  19:00    


 


Sodium  141 mEq/L (136-145)   19  06:46    


 


Potassium  4.1 mEq/L (3.5-5.1)   19  06:46    


 


Chloride  111 mEq/L ()  H  19  06:46    


 


Carbon Dioxide  19.0 mEq/L (21.0-31.0)  L  19  06:46    


 


Anion Gap  15.1  (7.0-16.0)   19  06:46    


 


BUN  67 mg/dL (7-25)  H  19  06:46    


 


Creatinine  5.4 mg/dL (0.7-1.3)  H*  19  06:46    


 


Est GFR ( Amer)  14.6 ml/min (>90)   19  06:46    


 


Est GFR (Non-Af Amer)  12.1 ml/min  19  06:46    


 


BUN/Creatinine Ratio  12.4   19  06:46    


 


Glucose  153 mg/dL ()  H  19  06:46    


 


POC Glucose  214 MG/DL (70 - 105)  H  19  11:29    


 


Whole Bld Lactic Acid  0.59 mmol/L (0.60-1.99)  L  19  19:00    


 


Calcium  8.7 mg/dL (8.6-10.3)   19  06:46    


 


Total Bilirubin  0.4 mg/dL (0.3-1.0)   19  19:00    


 


AST  18 U/L (13-39)   19  19:00    


 


ALT  20 U/L (7-52)   19  19:00    


 


Alkaline Phosphatase  172 U/L ()  H  19  19:00    


 


Ammonia  40 umol/L (16-53)   19  06:22    


 


Creatine Kinase  85 U/L ()   19  19:00    


 


Troponin I  0.02 ng/mL (0.01-0.05)   19  19:00    


 


B-Natriuretic Peptide  142.0 pg/mL (5.0-100.0)  H  19  19:00    


 


Total Protein  7.4 gm/dL (6.0-8.3)   19  19:00    


 


Albumin  3.2 gm/dL (4.2-5.5)  L  19  19:00    


 


Globulin  4.2 gm/dL  19  19:00    


 


Albumin/Globulin Ratio  0.8  (1.0-1.8)  L  19  19:00    


 


Triglycerides  219 mg/dL (<150)  H  19  19:00    


 


Cholesterol  73 mg/dL (<200)   19  19:00    


 


LDL Cholesterol Direct  18 mg/dL ()  L  19  19:00    


 


HDL Cholesterol  12 mg/dL (23-92)  L  19  19:00    


 


Amylase  50 U/L ()   19  19:00    


 


Lipase  39 U/L (11-82)   19  19:00    


 


Vitamin B12  569 pg/mL (232-1245)   19  06:22    


 


Folic Acid  >20.0 ng/mL (>3.0)   19  06:22    


 


TSH  0.89 uIU/ml (0.34-5.60)   19  06:22    


 


Urine Source  CLEAN C   19  21:26    


 


Urine Color  YELLOW   19  21:26    


 


Urine Clarity  HAZY  (CLEAR)   19  21:26    


 


Urine pH  8.5  (4.6 - 8.0)   19  21:26    


 


Ur Specific Gravity  1.020  (1.005-1.030)   19  21:26    


 


Urine Protein  >=300 mg/dL (NEGATIVE)   19  21:26    


 


Urine Glucose (UA)  NEGATIVE mg/dL (NEGATIVE)   19  21:    


 


Urine Ketones  NEGATIVE mg/dL (NEGATIVE)   19  21:26    


 


Urine Blood  MODERATE  (NEGATIVE)  H  19  21:26    


 


Urine Nitrate  POSITIVE  (NEGATIVE)  H  19  21:26    


 


Urine Bilirubin  NEGATIVE  (NEGATIVE)   19  21:26    


 


Urine Urobilinogen  0.2 E.U./dL (0.2 - 1.0)   19  21:26    


 


Ur Leukocyte Esterase  MODERATE  (NEGATIVE)  H  19  21:26    


 


Urine RBC  2-5 /hpf (0-5)  H  19  21:26    


 


Urine WBC  10-25 /hpf (0-5)  H  19  21:26    


 


Ur Epithelial Cells  FEW /lpf (FEW)   19  21:    


 


Urine Bacteria  1+ /hpf (NONE SEEN)  H  19  21:26    














- Physical Exam


Vitals and I&O: 


 Vital Signs











Temp  99.4 F   19 12:00


 


Pulse  112   19 12:00


 


Resp  20   19 12:00


 


BP  157/99   19 12:00


 


Pulse Ox  99   19 12:00








 Intake & Output











 19





 18:59 06:59 18:59


 


Intake Total 1600 932 438.667


 


Output Total 700  825


 


Balance 900 932 -386.333


 


Weight (lbs) 61.28 kg  63.049 kg


 


Intake:   


 


  Intake, IV Amount 1100 932 198.667


 


    D5-0.9%Ns 1,000 ml @ 125 1000  





    mls/hr IV .Q8H [facility] Rx#:   





    [id number]   


 


    D5-0.9%Ns 1,000 ml @ 40  732 198.667





    mls/hr IV .Q24H [facility] Rx#:   





    [id number]   


 


    Piperacillin Sodium/ 100 200 





    Tazobact 2.25 gm In   





    Sodium Chloride 0.9% 100   





    ml @ 100 mls/hr IV Q8HR   





    [facility] Rx#:[id number]   


 


  Oral 500  240


 


Output:   


 


  Urine 700  825


 


Other:   


 


  # Bowel Movements 0  2


 


  Stool Characteristics  Soft 





  Formed 





  Liquid 





  Brown 


 


  Weight Source Bedscale  Bedscale











Active Medications: 


Current Medications





Acetaminophen (Tylenol 650mg Supp)  650 mg RC Q4HR PRN


   PRN Reason: MILD PAIN OR TEMP >100


   Stop: 19 23:21


   Last Admin: 19 20:51 Dose:  650 mg


Acetaminophen/Hydrocodone Bitart (Norco 5mg/325mg)  1 tab PO Q4H PRN


   PRN Reason: MODERATE PAIN (LEVEL 4-6)


   Stop: 19 23:21


   Last Admin: 19 14:06 Dose:  1 tab


Albuterol Sulfate (Albuterol 2.5mg/3ml Neb Ud)  2.5 mg HHN Q2HRT PRN


   PRN Reason: Shortness of Breath or Wheeze


   Stop: 19 23:25


Baclofen (Lioresal)  10 mg PO QID [facility]


   Stop: 19 08:59


   Last Admin: 19 12:15 Dose:  10 mg


Famotidine (Pepcid)  20 mg PO DAILY@0730 [facility]


   Stop: 19 07:29


   Last Admin: 19 06:43 Dose:  20 mg


Heparin Sodium (Porcine) (Heparin)  5,000 units SUBQ Q12HR [facility]


   Stop: 19 08:59


   Last Admin: 19 08:37 Dose:  5,000 units


Piperacillin Sod/Tazobactam (Sod 2.25 gm/ Sodium Chloride)  100 mls @ 100 mls/

hr IV Q8HR [facility]


   Stop: 19 04:59


   Last Admin: 19 12:15 Dose:  100 mls/hr


Dextrose/Sodium Chloride (D5-0.9%Ns)  1,000 mls @ 40 mls/hr IV .Q24H [facility]


   Stop: 19 11:44


   Last Admin: 19 11:34 Dose:  40 mls/hr


Insulin Aspart (Novolog)  0 units SUBQ ACHS [facility]; Protocol


   Stop: 19 07:29


   Last Admin: 19 12:15 Dose:  2 units


Lactobacillus Rhamnosus (Culturelle 15b)  1 each PO DAILY [facility]


   Stop: 19 15:59


Loperamide HCl (Imodium)  4 mg PO PRN PRN


   PRN Reason: Loose Stools


   Stop: 19 23:21


Magnesium Hydroxide (Milk Of Magnesia)  30 ml PO Q72H PRN


   PRN Reason: Constipation


   Stop: 19 23:21


Metoprolol Tartrate (Lopressor)  50 mg PO BID [facility]


   Stop: 19 08:59


   Last Admin: 19 08:34 Dose:  50 mg


Mineral Oil (Mineral Oil 30 Ml)  30 ml PO BID [facility]


   Stop: 19 16:59


Miscellaneous (Probiotic Screen)  1 ea MC PRN PRN


   PRN Reason: PROTOCOL


   Stop: 19 14:43


Ondansetron HCl (Zofran)  4 mg IV Q8H PRN


   PRN Reason: Nausea / Vomiting


   Stop: 19 23:26


Sodium Phosphate (Fleet Enema)  135 ml RC PRN PRN


   PRN Reason: IF DULCOLAX INEFFECTIVE


   Stop: 19 23:21


   Last Admin: 19 05:05 Dose:  135 ml








General: congested, demented


HEENT: NC/AT, PERRLA, EOMI


Neck: Supple, No JVD, No thyromegaly


Lungs: congested, rales, ronchi


Cardiovascular: RRR, Normal S1, Normal S2


Abdomen: soft, non-tender, globular, distended, +GT, positive bowel sound


Extremities: excoriation, contracture





- Procedures


Procedures: 


 Procedures











Procedure Code Date


 


INSERT INDWELLING CATH 57.94 10/16/08


 


INSERT TEMP BLADDER CATH [id number] 10/16/08














Internal Medicine Assmt/Plan





- Assessment


Assessment: 





ASSESSMENT AND PLAN: bacteremia, abd distention,  Urinary tract infection, 

sepsis, acute renal failure,


kidney stones, leukocytosis, metabolic acidosis, low albumin, cerebral palsy,


mental retardation, spastic quadriplegia, hypertension, cardiomyopathy,


hypercholesterolemia, status post PEG, elevated D-dimer.





- Plan


Plan: 





 PLAN:   Continue the patient


on oxygen and bronchodilator treatment.  Continue aggressive IV hydration. 


Continue on broad-spectrum antibiotic.  We will review the patient's CT of the


abdomen and pelvis.  We will start the patient on heparin prophylactically.  We


will titrate the patient's antihypertensive medication.  We may consider a


Urology consultation.  We will continue to monitor the patient on the telemetry


bed.


will refer to dr spears


will add aminoglycoside





Nutritional Asmnt/Malnutr-PDOC





- Dietary Evaluation


Malnutrition Findings (Please click <Entered> for more info): 








Nutritional Asmnt/Malnutrition                             Start:  19 16:

19


Text:                                                      Status: Complete    

  


Freq:                                                                          

  


Protocol:                                                                      

  


 Document     19 16:20  LCPINO  (Rec: 19 16:[id number]  [id number])


 Nutritional Asmnt/Malnutrition


     Patient General Information


      Nutritional Screening                      High Risk


                                                 Consult


      Diagnosis                                  sepsis, UTI


      Pertinent Medical Hx/Surgical Hx           HTN, CVA/TIA, dyslipdiemia,


                                                 PUD/GERD, renal stone,


                                                 dementia, CP, osteoporosis


      Subjective Information                     Consult received for theresa Stewart


                                                 . Pt seen lying in bed at time


                                                 of visit, awake, non-verbal


                                                 noted. Spoke with RN CONCEPCION Rodriguez


                                                 reported pt ate well,


                                                 consumed 100% of lunch. Pt has


                                                 multiple renal stone noted


                                                 per nurse, urology doctor on


                                                 case.


      Current Diet Order/ Nutrition Support      JAMIE, vegerarian pureed, Two


                                                 Isai HN 1 can if PO<80%


      Pertinent Medications                      D5-0.9%ns, pepcid, novolog,


                                                 heparin, piperacillin, zofran


      Pertinent Labs                              BUN 65, Cr 5.0, gluocse


                                                 150, -222


                                                  Na 135, BUN 61, Cr 5.1,


                                                 Gluocse 145, alb 3.2


     Nutritional Hx/Data


      Height                                     1.6 m


      Height (Calculated Centimeters)            160.0


      Current Weight (lbs)                       61.235 kg


      Weight (Calculated Kilograms)              61.2


      Weight (Calculated Grams)                  61833.0


      Ideal Body Weight                          124


      Body Mass Index (BMI)                      23.9


      Weight Status                              Approriate


     GI Symptoms


      GI Symptoms                                None


      Last BM                                    not indicated


      Difficult in:                              None


      Skin Integrity/Comment:                    reddened to right and left


                                                 buttocks


      Current %PO                                Good (%)


     Estimated Nutritional Goals


      BEE in Kcals:                              Using Current wt


      Calories/Kcals/Kg                          27-32


      Kcals Calculated                           5807-5211


      Protein:                                   Using Current wt


      Protein g/k.8


      Protein Calculated                         49


      Fluid: ml                                  per MD


     Nutritional Problem


      1. Problem


       Problem                                   altered nutrition related labs


       Etiology                                  renal dysfunction,


                                                 hyperglycemia


       Signs/Symptoms:                           BUN 65, Cr 5.0, gluocse 150,


                                                 -222


     Intervention/Recommendation


      Comments                                   1. Continue with vegetarian


                                                 pureed JAMIE diet with


                                                 supplemental of Two Isai HN as


                                                 ordered.


                                                 2. Will continue to monitor


                                                 renal labs and consider adjust


                                                 protein intake, however pt is


                                                 on vegetarian diet which not


                                                 providing a large mount of


                                                 protien at this time.


                                                 3. Monitor PO intake, wt, labs


                                                 and skin integrity


                                                 4. F/U as high risk in 2-3


                                                 days


     Expected Outcomes/Goals


      Expected Outcomes/Goals                    1. PO intake to meet at least


                                                 75% of nutritional needs.


                                                 2. Wt stability, skin to


                                                 remain intact, labs to


                                                 approach WNL.

## 2019-01-18 NOTE — DIAGNOSTIC IMAGING REPORT
Exam: Ultrasound examination deep venous circulation lower extremities.



HISTORY: DVT



Findings:



Real-time ultrasound exam of the lower extremities performed multiple

planes utilizing color Doppler technique.



The study demonstrates normal compressibility and augmentation of deep

venous system throughout.



IMPRESSION:



No evidence of deep venous thrombosis lower extremities bilaterally.

## 2019-01-19 LAB
ALBUMIN SERPL-MCNC: 2.4 GM/DL (ref 4.2–5.5)
ALBUMIN/GLOB SERPL: 0.7 {RATIO} (ref 1–1.8)
ALP SERPL-CCNC: 191 U/L (ref 34–104)
ALT SERPL-CCNC: 22 U/L (ref 7–52)
ANION GAP SERPL CALC-SCNC: 15.4 MMOL/L (ref 7–16)
AST SERPL-CCNC: 16 U/L (ref 13–39)
BILIRUB SERPL-MCNC: 0.5 MG/DL (ref 0.3–1)
BUN SERPL-MCNC: 65 MG/DL (ref 7–25)
CALCIUM SERPL-MCNC: 8 MG/DL (ref 8.6–10.3)
CHLORIDE SERPL-SCNC: 114 MEQ/L (ref 98–107)
CO2 SERPL-SCNC: 18.7 MEQ/L (ref 21–31)
CREAT SERPL-MCNC: 5.3 MG/DL (ref 0.7–1.3)
GLOBULIN SER-MCNC: 3.6 GM/DL
GLUCOSE SERPL-MCNC: 129 MG/DL (ref 70–105)
MAGNESIUM SERPL-MCNC: 2.5 MG/DL (ref 1.9–2.7)
POTASSIUM SERPL-SCNC: 4.1 MEQ/L (ref 3.5–5.1)
SODIUM SERPL-SCNC: 144 MEQ/L (ref 136–145)

## 2019-01-19 RX ADMIN — HYDROCODONE BITARTRATE AND ACETAMINOPHEN PRN TAB: 5; 325 TABLET ORAL at 14:22

## 2019-01-19 RX ADMIN — INSULIN ASPART SCH: 100 INJECTION, SOLUTION INTRAVENOUS; SUBCUTANEOUS at 16:43

## 2019-01-19 RX ADMIN — Medication SCH TAB: at 08:40

## 2019-01-19 RX ADMIN — SODIUM CHLORIDE SCH MLS/HR: 9 INJECTION, SOLUTION INTRAVENOUS at 04:48

## 2019-01-19 RX ADMIN — Medication SCH EACH: at 08:40

## 2019-01-19 RX ADMIN — DEXTROSE AND SODIUM CHLORIDE SCH MLS/HR: 5; .9 INJECTION, SOLUTION INTRAVENOUS at 14:06

## 2019-01-19 RX ADMIN — INSULIN ASPART SCH UNITS: 100 INJECTION, SOLUTION INTRAVENOUS; SUBCUTANEOUS at 21:04

## 2019-01-19 RX ADMIN — SODIUM CHLORIDE SCH MLS/HR: 9 INJECTION, SOLUTION INTRAVENOUS at 14:08

## 2019-01-19 RX ADMIN — INSULIN ASPART SCH: 100 INJECTION, SOLUTION INTRAVENOUS; SUBCUTANEOUS at 06:53

## 2019-01-19 RX ADMIN — HYDROCODONE BITARTRATE AND ACETAMINOPHEN PRN TAB: 5; 325 TABLET ORAL at 23:29

## 2019-01-19 RX ADMIN — SODIUM CHLORIDE SCH MLS/HR: 9 INJECTION, SOLUTION INTRAVENOUS at 21:02

## 2019-01-19 RX ADMIN — INSULIN ASPART SCH: 100 INJECTION, SOLUTION INTRAVENOUS; SUBCUTANEOUS at 12:50

## 2019-01-19 NOTE — DIAGNOSTIC IMAGING REPORT
Exam: KUB of the abdomen.



HISTORY: Obstruction



Prior exam: CT examination of the abdomen 01/16/2019



Findings:



Multiple views of the abdomen reviewed.  The study demonstrates

extensive distention of the small and large bowel loops consistent with

ileus.  There is evidence of for multiple calcification in the right and

left kidneys.  Bony structures intact.  Moderate amount of fecal content

is noted



IMPRESSION:



Ileus, clinical correlation follow-up exam is recommended.



Multiple right and left renal calcifications

## 2019-01-19 NOTE — PROGRESS NOTES
DATE:  01/18/2019



SUBJECTIVE:  The patient remains uremic and tachycardic as we are giving him

Zosyn for his urinary tract infection.  Cultures are pending.  Surprisingly, he

is tolerating his diet quite well and ate 90% this morning.  The nurses have

communicated with the patient's mother's cousin about the treatment options,

namely nephrostomies versus conservative watchful waiting and comfort care.  We

are trying to find out who is the person incharge to make their decisions and

then I can explain in more details about the treatment options of this

complicated situation with bilateral stones and bilateral hydronephrosis with

infection.



PHYSICAL EXAMINATION:

VITAL SIGNS:  The patient's heart rate is 111, blood pressure 159/97,

temperature 99.5, respirations 20, saturating 99%.

GENERAL:  His mental status is about the same.

ABDOMEN:  More distended than yesterday, but soft.  Dr. Elias has ordered an

enema for him.

EXTREMITIES:  No edema.



LABORATORY DATA:  White count 14.5, BUN 67, creatinine 5.4.  All numbers are

slightly worse than yesterday.



ASSESSMENT:

1.  Bilateral ureteral stones with obstruction and hydronephrosis.  Treatment

choices are nephrostomies versus doing comfort care and then considering

dialysis as another option.

2.  Bilateral kidney stones, large volume disease, will require multiple

procedures to get free of stones.

3.  Renal failure, slightly worse, may require dialysis, the family wants to

pursue this.  We will treat the renal failure, but the patient will still have

the problems of infection in both kidneys.  Lastly, quadriplegia, no change.





DD: 01/18/2019 12:23

DT: 01/19/2019 11:36

JOB# [id number]  [id number]

## 2019-01-19 NOTE — INTERNAL MEDICINE PROG NOTE
Internal Medicine Subjective





- Subjective


Service Date: 19


Patient is:: awake, non-verbal, non-interactive, in bed


Patient Complaints of:: other (per staff, abd distended)


Per staff patient has:: no adverse event, no episodes of fall, tolerating meds





Internal Medicine Objective





- Results


Result Diagrams: 


 19 16:49





 19 06:45


Recent Labs: 


 Laboratory Last Values











WBC  17.2 Th/cmm (4.8-10.8)  H  19  16:49    


 


RBC  4.28 Mil/cmm (4.30-5.70)  L  19  16:49    


 


Hgb  11.7 gm/dL (12-16)  L  19  16:49    


 


Hct  35.3 % (41.0-60)  L  19  16:49    


 


MCV  82.3 fl (80-99)   19  16:49    


 


MCH  27.3 pg (26.0-30.0)   19  16:49    


 


MCHC Differential  33.2 pg (28.0-36.0)   19  16:49    


 


RDW  16.3 % (11.5-20.0)   19  16:49    


 


Plt Count  212 Th/cmm (150-400)   19  16:49    


 


MPV  6.5 fl  19  16:49    


 


Add Manual Diff  YES   19  16:49    


 


Neutrophils %  84.7 % (40.0-80.0)  H  19  19:00    


 


Band Neutrophils %  0 % (0-10)   19  16:49    


 


Lymphocytes %  3.0 % (20.0-50.0)  L  19  19:00    


 


Monocytes %  10.8 % (2.0-10.0)  H  19  19:00    


 


Eosinophils %  1.0 % (0.0-5.0)   19  19:00    


 


Basophils %  0.5 % (0.0-2.0)   19  19:00    


 


Neutrophils (Manual)  95 % (40-80)  H  19  16:49    


 


Lymphocytes  2 % (20-50)  L  19  16:49    


 


Monocytes  2 % (2-10)   19  16:49    


 


Eosinophils  1 % (0-5)   19  16:49    


 


Basophils  0 % (0-3)   19  16:49    


 


Platelet Estimate  ADEQUATE  (NORMAL)   19  06:22    


 


PT  9.8 SECONDS (9.5-11.5)   19  19:00    


 


INR  0.94  (0.5-1.4)   19  19:00    


 


PTT (Actin FS)  31.0 SECONDS (26.0-38.0)   19  19:00    


 


D-Dimer  3700 ng/mL (100-400)  H  19  19:00    


 


Sodium  144 mEq/L (136-145)   19  06:45    


 


Potassium  4.1 mEq/L (3.5-5.1)   19  06:45    


 


Chloride  114 mEq/L ()  H  19  06:45    


 


Carbon Dioxide  18.7 mEq/L (21.0-31.0)  L  19  06:45    


 


Anion Gap  15.4  (7.0-16.0)   19  06:45    


 


BUN  65 mg/dL (7-25)  H  19  06:45    


 


Creatinine  5.3 mg/dL (0.7-1.3)  H*  19  06:45    


 


Est GFR ( Amer)  14.9 ml/min (>90)   19  06:45    


 


Est GFR (Non-Af Amer)  12.3 ml/min  19  06:45    


 


BUN/Creatinine Ratio  12.3   19  06:45    


 


Glucose  129 mg/dL ()  H  19  06:45    


 


POC Glucose  119 MG/DL (70 - 105)  H  19  06:52    


 


Whole Bld Lactic Acid  0.59 mmol/L (0.60-1.99)  L  19  19:00    


 


Calcium  8.0 mg/dL (8.6-10.3)  L  19  06:45    


 


Magnesium  2.5 mg/dL (1.9-2.7)   19  06:45    


 


Total Bilirubin  0.5 mg/dL (0.3-1.0)   19  06:45    


 


AST  16 U/L (13-39)   19  06:45    


 


ALT  22 U/L (7-52)   19  06:45    


 


Alkaline Phosphatase  191 U/L ()  H  19  06:45    


 


Ammonia  40 umol/L (16-53)   19  06:22    


 


Creatine Kinase  85 U/L ()   19  19:00    


 


Troponin I  0.02 ng/mL (0.01-0.05)   19  19:00    


 


B-Natriuretic Peptide  142.0 pg/mL (5.0-100.0)  H  19  19:00    


 


Total Protein  6.0 gm/dL (6.0-8.3)   19  06:45    


 


Albumin  2.4 gm/dL (4.2-5.5)  L  19  06:45    


 


Globulin  3.6 gm/dL  19  06:45    


 


Albumin/Globulin Ratio  0.7  (1.0-1.8)  L  19  06:45    


 


Triglycerides  219 mg/dL (<150)  H  19  19:00    


 


Cholesterol  73 mg/dL (<200)   19  19:00    


 


LDL Cholesterol Direct  18 mg/dL ()  L  19  19:00    


 


HDL Cholesterol  12 mg/dL (23-92)  L  19  19:00    


 


Amylase  50 U/L ()   19  19:00    


 


Lipase  39 U/L (11-82)   19  19:00    


 


Vitamin B12  569 pg/mL (232-1245)   19  06:22    


 


Folic Acid  >20.0 ng/mL (>3.0)   19  06:22    


 


TSH  0.89 uIU/ml (0.34-5.60)   19  06:22    


 


Urine Source  CLEAN C   19  21:26    


 


Urine Color  YELLOW   19  21:26    


 


Urine Clarity  HAZY  (CLEAR)   19  21:26    


 


Urine pH  8.5  (4.6 - 8.0)   19  21:26    


 


Ur Specific Gravity  1.020  (1.005-1.030)   19  21:26    


 


Urine Protein  >=300 mg/dL (NEGATIVE)   19  21:26    


 


Urine Glucose (UA)  NEGATIVE mg/dL (NEGATIVE)   19  21:    


 


Urine Ketones  NEGATIVE mg/dL (NEGATIVE)   19  21:26    


 


Urine Blood  MODERATE  (NEGATIVE)  H  19  21:26    


 


Urine Nitrate  POSITIVE  (NEGATIVE)  H  19  21:26    


 


Urine Bilirubin  NEGATIVE  (NEGATIVE)   19  21:    


 


Urine Urobilinogen  0.2 E.U./dL (0.2 - 1.0)   19  21:26    


 


Ur Leukocyte Esterase  MODERATE  (NEGATIVE)  H  19  21:26    


 


Urine RBC  2-5 /hpf (0-5)  H  19  21:    


 


Urine WBC  10-25 /hpf (0-5)  H  19  21:26    


 


Ur Epithelial Cells  FEW /lpf (FEW)   19  21:    


 


Urine Bacteria  1+ /hpf (NONE SEEN)  H  19  21:26    














- Physical Exam


Vitals and I&O: 


 Vital Signs











Temp  97.4 F   19 07:55


 


Pulse  95   19 08:40


 


Resp  18   19 08:00


 


BP  133/88   19 08:40


 


Pulse Ox  100   19 07:55








 Intake & Output











 19





 18:59 06:59 18:59


 


Intake Total 632.880 0566.333 


 


Output Total 825 1550 


 


Balance -286.333 279.333 


 


Weight (lbs) 139 lb 141 lb 7 oz 


 


Intake:   


 


  Intake, IV Amount 298.667 949.333 


 


    D5-0.9%Ns 1,000 ml @ 40 198.667 749.333 





    mls/hr IV .Q24H [facility] Rx#:   





    [id number]   


 


    Piperacillin Sodium/ 100 200 





    Tazobact 2.25 gm In   





    Sodium Chloride 0.9% 100   





    ml @ 100 mls/hr IV Q8HR   





    [facility] Rx#:[id number]   


 


  Oral 240 880 


 


Output:   


 


  Urine 825 1550 


 


Other:   


 


  # Bowel Movements 2 1 


 


  Weight Source Bedscale Bedscale 











Active Medications: 


Current Medications





Acetaminophen (Tylenol 650mg Supp)  650 mg RC Q4HR PRN


   PRN Reason: MILD PAIN OR TEMP >100


   Stop: 19 23:21


   Last Admin: 19 20:51 Dose:  650 mg


Acetaminophen/Hydrocodone Bitart (Norco 5mg/325mg)  1 tab PO Q4H PRN


   PRN Reason: MODERATE PAIN (LEVEL 4-6)


   Stop: 19 23:21


   Last Admin: 19 14:06 Dose:  1 tab


Albuterol Sulfate (Albuterol 2.5mg/3ml Neb Ud)  2.5 mg HHN Q2HRT PRN


   PRN Reason: Shortness of Breath or Wheeze


   Stop: 19 23:25


Baclofen (Lioresal)  10 mg PO QID [facility]


   Stop: 19 08:59


   Last Admin: 19 08:40 Dose:  10 mg


Famotidine (Pepcid)  20 mg PO DAILY@0730 [facility]


   Stop: 19 07:29


   Last Admin: 19 06:50 Dose:  20 mg


Heparin Sodium (Porcine) (Heparin)  5,000 units SUBQ Q12HR [facility]


   Stop: 19 08:59


   Last Admin: 19 08:40 Dose:  5,000 units


Piperacillin Sod/Tazobactam (Sod 2.25 gm/ Sodium Chloride)  100 mls @ 100 mls/

hr IV Q8HR [facility]


   Stop: 19 04:59


   Last Infusion: 19 06:18 Dose:  Infused


Dextrose/Sodium Chloride (D5-0.9%Ns)  1,000 mls @ 40 mls/hr IV .Q24H [facility]


   Stop: 19 11:44


   Last Infusion: 19 06:18 Dose:  40 mls/hr


Insulin Aspart (Novolog)  0 units SUBQ ACHS [facility]; Protocol


   Stop: 19 07:29


   Last Admin: 19 06:53 Dose:  Not Given


Lactobacillus Rhamnosus (Culturelle 15b)  1 each PO DAILY [facility]


   Stop: 19 15:59


   Last Admin: 19 08:40 Dose:  1 each


Loperamide HCl (Imodium)  4 mg PO PRN PRN


   PRN Reason: Loose Stools


   Stop: 19 23:21


Magnesium Hydroxide (Milk Of Magnesia)  30 ml PO Q72H PRN


   PRN Reason: Constipation


   Stop: 19 23:21


Metoprolol Tartrate (Lopressor)  50 mg PO BID [facility]


   Stop: 19 08:59


   Last Admin: 19 08:40 Dose:  50 mg


Mineral Oil (Mineral Oil 30 Ml)  30 ml PO BID [facility]


   Stop: 19 16:59


   Last Admin: 19 08:40 Dose:  30 ml


Miscellaneous (Probiotic Screen)  1 ea  PRN PRN


   PRN Reason: PROTOCOL


   Stop: 19 14:43


Miscellaneous (Amikacin Iv Per Pharmacy)  1 [facility] PRN PRN


   PRN Reason: PROTOCOL


   Stop: 19 15:54


Ondansetron HCl (Zofran)  4 mg IV Q8H PRN


   PRN Reason: Nausea / Vomiting


   Stop: 19 23:26


Sodium Phosphate (Fleet Enema)  135 ml RC PRN PRN


   PRN Reason: IF DULCOLAX INEFFECTIVE


   Stop: 19 23:21


   Last Admin: 19 05:05 Dose:  135 ml








General: congested, demented


HEENT: NC/AT, PERRLA, EOMI


Neck: Supple, No JVD, No thyromegaly


Lungs: congested, rales, ronchi


Cardiovascular: RRR, Normal S1, Normal S2


Abdomen: soft, non-tender, globular, distended, +GT, positive bowel sound


Extremities: excoriation, contracture





- Procedures


Procedures: 


 Procedures











Procedure Code Date


 


INSERT INDWELLING CATH 57.94 10/16/08


 


INSERT TEMP BLADDER CATH [id number] 10/16/08














Internal Medicine Assmt/Plan





- Assessment


Assessment: 





bacteremia, abd distention,  Urinary tract infection, sepsis, acute renal 

failure,


kidney stones, leukocytosis, metabolic acidosis, low albumin, cerebral palsy,


mental retardation, spastic quadriplegia, hypertension, cardiomyopathy,


hypercholesterolemia, status post PEG, elevated D-dimer.








- Plan


Plan: 





continue ivabx


ivf for hydration


am labs


aspiration precautions


continue current plan of care 





Nutritional Asmnt/Malnutr-PDOC





- Dietary Evaluation


Malnutrition Findings (Please click <Entered> for more info): 








Nutritional Asmnt/Malnutrition                             Start:  19 16:

19


Text:                                                      Status: Complete    

  


Freq:                                                                          

  


Protocol:                                                                      

  


 Document     19 16:20  [facility]  (Rec: 19 16:36  [facility]  [id number])


 Nutritional Asmnt/Malnutrition


     Patient General Information


      Nutritional Screening                      High Risk


                                                 Consult


      Diagnosis                                  sepsis, UTI


      Pertinent Medical Hx/Surgical Hx           HTN, CVA/TIA, dyslipdiemia,


                                                 PUD/GERD, renal stone,


                                                 dementia, CP, osteoporosis


      Subjective Information                     Consult received for theresa Stewart


                                                 . Pt seen lying in bed at time


                                                 of visit, awake, non-verbal


                                                 noted. Spoke with CONCEPCION Rodriguez, RN


                                                 reported pt ate well,


                                                 consumed 100% of lunch. Pt has


                                                 multiple renal stone noted


                                                 per nurse, urology doctor on


                                                 case.


      Current Diet Order/ Nutrition Support      JAMIE, vegerarian pureed, Two


                                                 Isai HN 1 can if PO<80%


      Pertinent Medications                      D5-0.9%ns, pepcid, novolog,


                                                 heparin, piperacillin, zofran


      Pertinent Labs                              BUN 65, Cr 5.0, gluocse


                                                 150, -222


                                                  Na 135, BUN 61, Cr 5.1,


                                                 Gluocse 145, alb 3.2


     Nutritional Hx/Data


      Height                                     5 ft 3 in


      Height (Calculated Centimeters)            160.0


      Current Weight (lbs)                       135 lb


      Weight (Calculated Kilograms)              61.2


      Weight (Calculated Grams)                  55104.0


      Ideal Body Weight                          124


      Body Mass Index (BMI)                      23.9


      Weight Status                              Approriate


     GI Symptoms


      GI Symptoms                                None


      Last BM                                    not indicated


      Difficult in:                              None


      Skin Integrity/Comment:                    reddened to right and left


                                                 buttocks


      Current %PO                                Good (%)


     Estimated Nutritional Goals


      BEE in Kcals:                              Using Current wt


      Calories/Kcals/Kg                          27-32


      Kcals Calculated                           0242-6015


      Protein:                                   Using Current wt


      Protein g/k.8


      Protein Calculated                         49


      Fluid: ml                                  per MD


     Nutritional Problem


      1. Problem


       Problem                                   altered nutrition related labs


       Etiology                                  renal dysfunction,


                                                 hyperglycemia


       Signs/Symptoms:                           BUN 65, Cr 5.0, gluocse 150,


                                                 -222


     Intervention/Recommendation


      Comments                                   1. Continue with vegetarian


                                                 pureed JAMIE diet with


                                                 supplemental of Two Isai HN as


                                                 ordered.


                                                 2. Will continue to monitor


                                                 renal labs and consider adjust


                                                 protein intake, however pt is


                                                 on vegetarian diet which not


                                                 providing a large mount of


                                                 protien at this time.


                                                 3. Monitor PO intake, wt, labs


                                                 and skin integrity


                                                 4. F/U as high risk in 2-3


                                                 days


     Expected Outcomes/Goals


      Expected Outcomes/Goals                    1. PO intake to meet at least


                                                 75% of nutritional needs.


                                                 2. Wt stability, skin to


                                                 remain intact, labs to


                                                 approach WNL.

## 2019-01-19 NOTE — CONSULTATION
DATE OF CONSULTATION:  01/19/2019



INPATIENT GASTROINTESTINAL CONSULTATION



REFERRING PHYSICIAN:  Dr. Elias.



REASON FOR CONSULTATION:  Abdominal distention.



HISTORY OF PRESENT ILLNESS:  A 48-year-old male with kidney stone, cerebral

palsy, mental retardation, dysphagia, presents to the hospital with abdominal

distention.  We were asked to see the patient.  He is otherwise a poor

historian.



PAST MEDICAL HISTORY:  Kidney stone, cerebral palsy, mental retardation,

hypertension, cardiomyopathy, dysphagia.



PAST SURGICAL HISTORY:  G-tube placement.



FAMILY HISTORY:  Noncontributory.



SOCIAL HISTORY:  Resident of [facility].



ALLERGIES:  SULFA AND DOCUSATE.



CURRENT MEDICATIONS:  Tylenol, Norco, baclofen, Pepcid, heparin, insulin,

Culturelle, Imodium, milk of magnesia, Lopressor, mineral oil, probiotics,

amikacin, Zofran, Zosyn, fleet enema.



REVIEW OF SYSTEMS:  Unobtainable.



PHYSICAL EXAMINATION:

VITAL SIGNS:  Temperature 98.7, breathing 20, pulse 79, blood pressure 161/94,

satting 94%.

GENERAL:  In no apparent distress.

EYES:  Anicteric.  Normal conjunctivae.

HEENT:  Normocephalic, atraumatic.  Moist mucous membranes.

NECK:  Soft, supple.

CHEST:  Clear, normal effort.

CARDIOVASCULAR:  Regular rate and rhythm.

ABDOMEN:  Soft, nontender, mildly distended, decreased bowel sounds.

SKIN:  Warm and dry.

EXTREMITIES:  Reveal no cyanosis.



LABORATORY DATA:  Show white count of 17.2, hemoglobin 11.7, platelets 212,

creatinine 5.3.  Urine is nitrite positive, leukocyte esterase positive.



IMPRESSION:  A 48-year-old male with abdominal distention secondary to an ileus.

 Ileus is likely being driven by the underlying obstructive urinary condition

resulting in azotemia.  This should be addressed by Urology.  The patient also

has evidence of gallstones, but does not appear to have any symptoms from that. 

It could be decided later on if the patient is symptomatic from his gallstones

so that a cholecystectomy can be performed.



PLAN:

1.  Provide patient with mineral oil.

2.  Treat underlying urinary obstructive condition and defer to Urology.

3.  Consider cholecystectomy if symptoms.

4.  Check a HIDA scan.



Thank you for allowing me to participate.  Please call me if any questions.





DD: 01/19/2019 07:53

DT: 01/19/2019 21:10

JOB# [id number]  [id number]

## 2019-01-20 LAB
ANION GAP SERPL CALC-SCNC: 18 MMOL/L (ref 7–16)
BASOPHILS # BLD AUTO: 0 TH/CUMM (ref 0–0.2)
BUN SERPL-MCNC: 73 MG/DL (ref 7–25)
CALCIUM SERPL-MCNC: 8.5 MG/DL (ref 8.6–10.3)
CHLORIDE SERPL-SCNC: 116 MEQ/L (ref 98–107)
CO2 SERPL-SCNC: 17.3 MEQ/L (ref 21–31)
CREAT SERPL-MCNC: 5.6 MG/DL (ref 0.7–1.3)
CREAT UR-MCNC: 88 MG/DL (ref 39–259)
EOSINOPHIL # BLD AUTO: 0.2 TH/CMM (ref 0.1–0.4)
EOSINOPHIL # BLD MANUAL: (no result) 10*3/UL
EOSINOPHIL BLD QL WRIGHT STN: (no result)
ERYTHROCYTE [DISTWIDTH] IN BLOOD BY AUTOMATED COUNT: 16.6 % (ref 11.5–20)
GLUCOSE SERPL-MCNC: 135 MG/DL (ref 70–105)
HCT VFR BLD CALC: 36.5 % (ref 41–60)
HGB BLD-MCNC: 12 GM/DL (ref 12–16)
LYMPHOCYTE AB SER FC-ACNC: 1.1 TH/CMM (ref 1.5–3)
LYMPHOCYTES # BLD MANUAL: 10 % (ref 20–50)
MCH RBC QN AUTO: 27 PG (ref 26–30)
MCHC RBC AUTO-ENTMCNC: 32.8 PG (ref 28–36)
MCV RBC AUTO: 82.3 FL (ref 80–99)
MONOCYTES # BLD AUTO: 0.5 TH/CMM (ref 0.3–1)
MONOCYTES # BLD MANUAL: 6 % (ref 2–10)
NEUTROPHILS # BLD: 16.5 TH/CMM (ref 1.8–8)
NEUTROPHILS NFR BLD AUTO: 84 % (ref 40–80)
PLATELET # BLD EST: ADEQUATE 10*3/UL
PLATELET # BLD: 289 TH/CMM (ref 150–400)
PMV BLD AUTO: 7 FL
POTASSIUM SERPL-SCNC: 4.3 MEQ/L (ref 3.5–5.1)
RBC # BLD AUTO: 4.43 MIL/CMM (ref 4.3–5.7)
SODIUM SERPL-SCNC: 147 MEQ/L (ref 136–145)
WBC # BLD AUTO: 18.3 TH/CMM (ref 4.8–10.8)

## 2019-01-20 RX ADMIN — HYDROCODONE BITARTRATE AND ACETAMINOPHEN PRN TAB: 5; 325 TABLET ORAL at 06:43

## 2019-01-20 RX ADMIN — Medication SCH TAB: at 08:41

## 2019-01-20 RX ADMIN — HYDROCODONE BITARTRATE AND ACETAMINOPHEN PRN TAB: 5; 325 TABLET ORAL at 22:31

## 2019-01-20 RX ADMIN — DEXTROSE AND SODIUM CHLORIDE SCH MLS/HR: 5; .9 INJECTION, SOLUTION INTRAVENOUS at 13:11

## 2019-01-20 RX ADMIN — INSULIN ASPART SCH: 100 INJECTION, SOLUTION INTRAVENOUS; SUBCUTANEOUS at 20:41

## 2019-01-20 RX ADMIN — INSULIN ASPART SCH: 100 INJECTION, SOLUTION INTRAVENOUS; SUBCUTANEOUS at 06:44

## 2019-01-20 RX ADMIN — INSULIN ASPART SCH: 100 INJECTION, SOLUTION INTRAVENOUS; SUBCUTANEOUS at 16:38

## 2019-01-20 RX ADMIN — Medication SCH EACH: at 08:41

## 2019-01-20 RX ADMIN — SODIUM CHLORIDE SCH MLS/HR: 9 INJECTION, SOLUTION INTRAVENOUS at 04:49

## 2019-01-20 RX ADMIN — SODIUM CHLORIDE SCH MLS/HR: 9 INJECTION, SOLUTION INTRAVENOUS at 13:06

## 2019-01-20 RX ADMIN — SODIUM CHLORIDE SCH MLS/HR: 9 INJECTION, SOLUTION INTRAVENOUS at 20:32

## 2019-01-20 RX ADMIN — INSULIN ASPART SCH: 100 INJECTION, SOLUTION INTRAVENOUS; SUBCUTANEOUS at 12:26

## 2019-01-20 NOTE — CONSULTATION
DATE OF CONSULTATION:  01/20/2019



ATTENDING PHYSICIAN:  Dr. Raomne Elias.



REASON FOR CONSULTATION:  Worsening kidney function, electrolyte imbalance, and

fluid management.



HISTORY OF PRESENT ILLNESS:  This is a 48-year-old  male with past

medical history of chronic kidney disease, who was brought in because of

abdominal discomfort.



A few hours prior to admission, the patient developed worsening abdominal

discomfort/pain.  This was associated with nausea and vomiting.  He was then

brought to the Emergency Room.



CT scan of the abdomen and pelvis revealed right hydronephrosis with

renal/ureteral calculi, left hydronephrosis with renal/ureteral calculi, urinary

bladder stone with wall thickening, left hemorrhagic cyst, stool with multiple

gas filled loops of bowel, cholelithiasis.  KUB showed ileus.



He was seen by Urology who recommended conservative measures based on the

patient's clinical condition and medical history.



His white count was 8.3 today.



His sodium was 147 with a BUN/creatinine of 73/5.6.



He now has developed gross hematuria.



PAST MEDICAL HISTORY:

1.  Chronic kidney disease.

2.  Profound intellectual disability.

3.  Cerebral palsy.

4.  Epilepsy.

5.  Legally blind.

6.  Chronic bilateral renal/ureteral calculi.

7.  Chronic bilateral hydronephrosis.

8.  Mitral valve regurgitation.

9.  Recurrent UTI.

10.  Essential hypertension.

11.  Dyslipidemia.

12.  Cardiomyopathy.

13.  Severe malnutrition.



CURRENT MEDICATIONS:  He is currently on acetaminophen, albuterol, amikacin,

baclofen, clonidine, famotidine, hydrocodone/APAP, aspart, lactobacillus,

loperamide, magnesium hydroxide, metoprolol tartrate, multivitamins, Zosyn.



ALLERGIES:  SULFA as well as DOCUSATE SODIUM.



SOCIAL AND FAMILY HISTORY:  I was not able to obtain directly from the patient

because he remains nonverbal.



REVIEW OF SYSTEMS:  Again, I was not able to decipher from the patient because

of the same reason.



PHYSICAL EXAMINATION:

GENERAL:  The patient's eyes are open, but remains nonresponsive or nonreactive.

VITAL SIGNS:  His blood pressure is 137/80, pulse 110, temperature 98.8 degrees.

SKIN:  Poor turgor, warm, no rash, no jaundice appreciated.

HEENT:  Head normocephalic, atraumatic.  Eyes:  Extraocular muscles intact. 

Pupils equal, round, reactive to light and accommodates.  Anicteric sclerae. 

Pale conjunctivae.  Nose, midline nasal septum.  Mouth:  Dry mucosa, adequate

dentition.

NECK:  Supple, no adenopathy, no thyromegaly, no bruits.  Trachea palpated in

the midline.

CHEST AND CARDIOVASCULAR:  S1, S2.  No rub, murmur nor gallop appreciated. 

Point of maximal impulse fifth intercostal space, left midclavicular line.  No

abdominal or femoral bruits appreciated.

LUNGS:  Equal expansion, no use of accessory muscles.  No supraclavicular

retraction.  Clear to auscultation without any wheeze.

ABDOMEN:  Distended, firm, extensive borborygmi on auscultation, there is

tenderness on deep palpation, but there is no rebound nor muscle guarding. 

Urinary bladder not distended.

GENITOURINARY:  Normal appearing male genitalia with indwelling Zhao catheter.

MUSCULOSKELETAL:  No effusions present in his joints, but unable to assess his

range of motion.

EXTREMITIES:  No evidence of edema, cyanosis nor clubbing.  He has

underdeveloped lower extremities as compared to the rest of his body.

NEUROLOGIC:  The patient, as mentioned, is awake, remains nonverbal, does not

follow commands, so I was not able to pursue further my neuro exam.



LABORATORY DATA AND STUDIES:  Did reveal white count 18.3, hemoglobin 12,

hematocrit 36.5.  Sodium 147, potassium 4.3, chloride 116, CO2 is 17, BUN 73,

creatinine 5.3, calcium 8.5.



IMPRESSION:

1.  Chronic kidney disease, MDRD GFR of 11.6 mL per minute, stage 5.

The patient's chronic kidney disease is secondary to longstanding history of

urinary obstruction due to presence of multiple renal and ureteral calculi, also

consider chronic interstitial nephritis secondary to multiple sepsis in the form

of complicated UTI or pyelonephritis.

2.  Chronic bilateral renal/ureteral calculi.

3.  Bilateral hydronephrosis.

4.  Ileus secondary to ongoing sepsis.

5.  Nausea and vomiting secondary to ileus and cholelithiasis.

6.  Cholelithiasis.

7.  Proteus mirabilis, complicated urinary tract infection.

8.  Pseudomonas aeruginosa bacteremia.

9.  Hypernatremia secondary to hypertonic solution.

10.  Severe malnutrition.

11.  Profound intellectual disability.

12.  Cerebral palsy.

13.  Epilepsy.

14.  Legally blind.

15.  Mitral valve regurgitation.

16.  Essential hypertension.

17.  Dyslipidemia.

18.  Cardiomyopathy.



PLAN:

1.  Urine sodium, eosinophils, and creatinine.

2.  Urine microalbumin to creatinine ratio.

3.  Gentle hydration.

4.  Flush Zhao catheter as needed.

5.  Follow up electrolytes as well as CBC.

6.  The patient is currently full code.  He requires hemodialysis based on his

worsening kidney function, metabolic acidosis as well as uremia while further

discussions/decision in progress with permanent solution.  I have left a message

with mother.





DD: 01/20/2019 16:39

DT: 01/20/2019 19:50

JOB# [id number]  [id number]

## 2019-01-20 NOTE — GI PROGRESS NOTE
Subjective





- Review of Systems


Subjective: 





NO BM





Objective





- Results


Result Diagrams: 


 01/20/19 05:55





 01/20/19 05:55


Recent Labs: 


 Laboratory Last Values











WBC  18.3 Th/cmm (4.8-10.8)  H  01/20/19  05:55    


 


RBC  4.43 Mil/cmm (4.30-5.70)   01/20/19  05:55    


 


Hgb  12.0 gm/dL (12-16)   01/20/19  05:55    


 


Hct  36.5 % (41.0-60)  L  01/20/19  05:55    


 


MCV  82.3 fl (80-99)   01/20/19  05:55    


 


MCH  27.0 pg (26.0-30.0)   01/20/19  05:55    


 


MCHC Differential  32.8 pg (28.0-36.0)   01/20/19  05:55    


 


RDW  16.6 % (11.5-20.0)   01/20/19  05:55    


 


Plt Count  289 Th/cmm (150-400)   01/20/19  05:55    


 


MPV  7.0 fl  01/20/19  05:55    


 


Add Manual Diff  YES   01/20/19  05:55    


 


Neutrophils %  84.7 % (40.0-80.0)  H  01/16/19  19:00    


 


Band Neutrophils %  0 % (0-10)   01/18/19  16:49    


 


Lymphocytes %  3.0 % (20.0-50.0)  L  01/16/19  19:00    


 


Monocytes %  10.8 % (2.0-10.0)  H  01/16/19  19:00    


 


Eosinophils %  1.0 % (0.0-5.0)   01/16/19  19:00    


 


Basophils %  0.5 % (0.0-2.0)   01/16/19  19:00    


 


Neutrophils (Manual)  84 % (40-80)  H  01/20/19  05:55    


 


Lymphocytes  10 % (20-50)  L  01/20/19  05:55    


 


Monocytes  6 % (2-10)   01/20/19  05:55    


 


Eosinophils  1 % (0-5)   01/18/19  16:49    


 


Basophils  0 % (0-3)   01/18/19  16:49    


 


Platelet Estimate  ADEQUATE  (NORMAL)   01/20/19  05:55    


 


PT  9.8 SECONDS (9.5-11.5)   01/16/19  19:00    


 


INR  0.94  (0.5-1.4)   01/16/19  19:00    


 


PTT (Actin FS)  31.0 SECONDS (26.0-38.0)   01/16/19  19:00    


 


D-Dimer  3700 ng/mL (100-400)  H  01/16/19  19:00    


 


Sodium  144 mEq/L (136-145)   01/19/19  06:45    


 


Potassium  4.1 mEq/L (3.5-5.1)   01/19/19  06:45    


 


Chloride  114 mEq/L ()  H  01/19/19  06:45    


 


Carbon Dioxide  18.7 mEq/L (21.0-31.0)  L  01/19/19  06:45    


 


Anion Gap  15.4  (7.0-16.0)   01/19/19  06:45    


 


BUN  65 mg/dL (7-25)  H  01/19/19  06:45    


 


Creatinine  5.3 mg/dL (0.7-1.3)  H*  01/19/19  06:45    


 


Est GFR ( Amer)  14.9 ml/min (>90)   01/19/19  06:45    


 


Est GFR (Non-Af Amer)  12.3 ml/min  01/19/19  06:45    


 


BUN/Creatinine Ratio  12.3   01/19/19  06:45    


 


Glucose  129 mg/dL ()  H  01/19/19  06:45    


 


POC Glucose  129 MG/DL (70 - 105)  H  01/20/19  05:55    


 


Whole Bld Lactic Acid  0.59 mmol/L (0.60-1.99)  L  01/16/19  19:00    


 


Calcium  8.0 mg/dL (8.6-10.3)  L  01/19/19  06:45    


 


Magnesium  2.5 mg/dL (1.9-2.7)   01/19/19  06:45    


 


Total Bilirubin  0.5 mg/dL (0.3-1.0)   01/19/19  06:45    


 


AST  16 U/L (13-39)   01/19/19  06:45    


 


ALT  22 U/L (7-52)   01/19/19  06:45    


 


Alkaline Phosphatase  191 U/L ()  H  01/19/19  06:45    


 


Ammonia  40 umol/L (16-53)   01/17/19  06:22    


 


Creatine Kinase  85 U/L ()   01/16/19  19:00    


 


Troponin I  0.02 ng/mL (0.01-0.05)   01/16/19  19:00    


 


B-Natriuretic Peptide  142.0 pg/mL (5.0-100.0)  H  01/16/19  19:00    


 


Total Protein  6.0 gm/dL (6.0-8.3)   01/19/19  06:45    


 


Albumin  2.4 gm/dL (4.2-5.5)  L  01/19/19  06:45    


 


Globulin  3.6 gm/dL  01/19/19  06:45    


 


Albumin/Globulin Ratio  0.7  (1.0-1.8)  L  01/19/19  06:45    


 


Triglycerides  219 mg/dL (<150)  H  01/16/19  19:00    


 


Cholesterol  73 mg/dL (<200)   01/16/19  19:00    


 


LDL Cholesterol Direct  18 mg/dL ()  L  01/16/19  19:00    


 


HDL Cholesterol  12 mg/dL (23-92)  L  01/16/19  19:00    


 


Amylase  50 U/L ()   01/16/19  19:00    


 


Lipase  39 U/L (11-82)   01/16/19  19:00    


 


Vitamin B12  569 pg/mL (232-1245)   01/17/19  06:22    


 


Folic Acid  >20.0 ng/mL (>3.0)   01/17/19  06:22    


 


TSH  0.89 uIU/ml (0.34-5.60)   01/17/19  06:22    


 


Urine Source  CLEAN C   01/16/19  21:26    


 


Urine Color  YELLOW   01/16/19  21:26    


 


Urine Clarity  HAZY  (CLEAR)   01/16/19  21:26    


 


Urine pH  8.5  (4.6 - 8.0)   01/16/19  21:26    


 


Ur Specific Gravity  1.020  (1.005-1.030)   01/16/19  21:26    


 


Urine Protein  >=300 mg/dL (NEGATIVE)   01/16/19  21:26    


 


Urine Glucose (UA)  NEGATIVE mg/dL (NEGATIVE)   01/16/19  21:26    


 


Urine Ketones  NEGATIVE mg/dL (NEGATIVE)   01/16/19  21:26    


 


Urine Blood  MODERATE  (NEGATIVE)  H  01/16/19  21:26    


 


Urine Nitrate  POSITIVE  (NEGATIVE)  H  01/16/19  21:26    


 


Urine Bilirubin  NEGATIVE  (NEGATIVE)   01/16/19  21:26    


 


Urine Urobilinogen  0.2 E.U./dL (0.2 - 1.0)   01/16/19  21:26    


 


Ur Leukocyte Esterase  MODERATE  (NEGATIVE)  H  01/16/19  21:26    


 


Urine RBC  2-5 /hpf (0-5)  H  01/16/19  21:26    


 


Urine WBC  10-25 /hpf (0-5)  H  01/16/19  21:26    


 


Ur Epithelial Cells  FEW /lpf (FEW)   01/16/19  21:26    


 


Urine Bacteria  1+ /hpf (NONE SEEN)  H  01/16/19  21:26    














- Physical Exam


Vitals and I&O: 


 Vital Signs











Temp  98.6 F   01/20/19 04:00


 


Pulse  117   01/20/19 06:07


 


Resp  20   01/20/19 04:00


 


BP  182/119   01/20/19 04:00


 


Pulse Ox  99   01/20/19 04:00








 Intake & Output











 01/19/19 01/20/19 01/20/19





 18:59 06:59 18:59


 


Intake Total 250.667 975.833 24.167


 


Output Total  1350 


 


Balance 250.667 -374.167 24.167


 


Weight (lbs)  63.957 kg 


 


Intake:   


 


  Intake, IV Amount 250.667 275.833 24.167


 


    D5-0.9%Ns 1,000 ml @ 40 250.667  





    mls/hr IV .Q24H [facility] Rx#:   





    [id number]   


 


    Piperacillin Sodium/  275.833 24.167





    Tazobact 2.25 gm In   





    Sodium Chloride 0.9% 100   





    ml @ 100 mls/hr IV Q8HR   





    [facility] Rx#:[id number]   


 


  Oral  700 


 


Output:   


 


  Urine  1350 


 


Other:   


 


  # Bowel Movements  0 


 


  Weight Source  Bedscale 











Active Medications: 


Current Medications





Acetaminophen (Tylenol 650mg Supp)  650 mg RC Q4HR PRN


   PRN Reason: MILD PAIN OR TEMP >100


   Stop: 03/17/19 23:21


   Last Admin: 01/17/19 20:51 Dose:  650 mg


Acetaminophen/Hydrocodone Bitart (Norco 5mg/325mg)  1 tab PO Q4H PRN


   PRN Reason: MODERATE PAIN (LEVEL 4-6)


   Stop: 03/17/19 23:21


   Last Admin: 01/20/19 06:43 Dose:  1 tab


Albuterol Sulfate (Albuterol 2.5mg/3ml Neb Ud)  2.5 mg HHN Q2HRT PRN


   PRN Reason: Shortness of Breath or Wheeze


   Stop: 03/17/19 23:25


Baclofen (Lioresal)  10 mg PO QID [facility]


   Stop: 03/18/19 08:59


   Last Admin: 01/19/19 21:03 Dose:  10 mg


Famotidine (Pepcid)  20 mg PO DAILY@0730 [facility]


   Stop: 03/18/19 07:29


   Last Admin: 01/20/19 06:43 Dose:  20 mg


Heparin Sodium (Porcine) (Heparin)  5,000 units SUBQ Q12HR [facility]


   Stop: 03/18/19 08:59


   Last Admin: 01/19/19 21:04 Dose:  5,000 units


Piperacillin Sod/Tazobactam (Sod 2.25 gm/ Sodium Chloride)  100 mls @ 100 mls/

hr IV Q8HR [facility]


   Stop: 03/18/19 04:59


   Last Infusion: 01/20/19 07:11 Dose:  Infused


Dextrose/Sodium Chloride (D5-0.9%Ns)  1,000 mls @ 40 mls/hr IV .Q24H [facility]


   Stop: 03/18/19 11:44


   Last Admin: 01/19/19 14:06 Dose:  40 mls/hr


Insulin Aspart (Novolog)  0 units SUBQ ACHS [facility]; Protocol


   Stop: 03/18/19 07:29


   Last Admin: 01/20/19 06:44 Dose:  Not Given


Lactobacillus Rhamnosus (Culturelle 15b)  1 each PO DAILY [facility]


   Stop: 03/19/19 15:59


   Last Admin: 01/19/19 08:40 Dose:  1 each


Loperamide HCl (Imodium)  4 mg PO PRN PRN


   PRN Reason: Loose Stools


   Stop: 03/17/19 23:21


Magnesium Hydroxide (Milk Of Magnesia)  30 ml PO Q72H PRN


   PRN Reason: Constipation


   Stop: 03/17/19 23:21


Metoprolol Tartrate (Lopressor)  50 mg PO TID [facility]


   Stop: 03/21/19 08:59


Mineral Oil (Mineral Oil 30 Ml)  30 ml PO BID [facility]


   Stop: 03/19/19 16:59


   Last Admin: 01/19/19 16:29 Dose:  30 ml


Miscellaneous (Probiotic Screen)  1 ea MC PRN PRN


   PRN Reason: PROTOCOL


   Stop: 03/19/19 14:43


Miscellaneous (Amikacin Iv Per Pharmacy)  1 ea MC PRN PRN


   PRN Reason: PROTOCOL


   Stop: 03/19/19 15:54


Ondansetron HCl (Zofran)  4 mg IV Q8H PRN


   PRN Reason: Nausea / Vomiting


   Stop: 03/17/19 23:26


Sodium Phosphate (Fleet Enema)  135 ml RC PRN PRN


   PRN Reason: IF DULCOLAX INEFFECTIVE


   Stop: 03/17/19 23:21


   Last Admin: 01/18/19 05:05 Dose:  135 ml











- Procedures


Procedures: 


 Procedures











Procedure Code Date


 


INSERT INDWELLING CATH 57.94 10/16/08


 


INSERT TEMP BLADDER CATH 51339 10/16/08














Assessment/Plan





- Problem List


Patient Problems: 


All Active Problems





VOMITING (Acute) 











- Assessment


Assessment: 





47 YO MALE WITH ABD DISTENSION DUE TO ILEUS


PT ALSO HAS GALLSTONES AND HIDA IS NEG ON PRELIM READ


CAUSE OF ILEUS LIKELY DUE TO UNDERLYING URINARY OBSTRUCTION AND INFECTION





1.MINERAL OIL


2.NEEDS UROLOGY INPUT


3.ALEA IF SX'S

## 2019-01-20 NOTE — PROGRESS NOTES
DATE:  



UROLOGY PROGRESS NOTE



SUBJECTIVE:  The patient has developed some hematuria today and his BUN and

creatinine are almost stable, maybe slightly high and he has not had much of

fever.  On exam, he is afebrile.  Vital signs are stable.  He is tolerating

diet, but does not eat very well.



PHYSICAL EXAMINATION: 

ABDOMEN:  Soft and less distended, with both flanks mildly tender to percussion.

 Bladder is not palpable.

EXTREMITIES:  Contracted without edema.

CARDIOVASCULAR:  Heart sounds, sinus rhythm.



LABORATORY DATA:  Creatinine 5.6 about the same or slightly higher than before.



IMPRESSION:

1.  Bilateral hydronephrosis secondary to large ureteral stones.

2.  Bilateral kidney stones more than 2 cm on each side.

3.  Renal failure.  Nephrology has been consulted, but the problem is

obstructive and requires surgical treatment and for not too many nephrology

options except dialysis.  I would like to discuss and explain these options with

the patient's caretaker or legal guardian and I have been trying for the last 3

days to get name and contact information, but have been unable to get it in

spite of multiple reminders to the nurses.  The family or the legal guardian

needs to understand the treatment options are nephrostomies, which will be

almost permanent or multiple surgeries on both kidneys and both ureters or

dialysis or comfort care.  These need to be explained in great details for them

to make an informed and appropriate decision in the patient's interest. 

Hopefully, I will get that person and be able to do this soon.





DD: 01/20/2019 14:16

DT: 01/20/2019 20:03

JOB# [id number]  [id number]

## 2019-01-20 NOTE — INTERNAL MEDICINE PROG NOTE
Internal Medicine Subjective





- Subjective


Service Date: 19 (still with hematuria)


Patient is:: awake, non-verbal, non-interactive, in bed


Patient Complaints of:: other (hematuria, abd distention)


Per staff patient has:: no adverse event, no episodes of fall, tolerating meds





Internal Medicine Objective





- Results


Result Diagrams: 


 19 05:55





 19 05:55


Recent Labs: 


 Laboratory Last Values











WBC  18.3 Th/cmm (4.8-10.8)  H  19  05:55    


 


RBC  4.43 Mil/cmm (4.30-5.70)   19  05:55    


 


Hgb  12.0 gm/dL (12-16)   19  05:55    


 


Hct  36.5 % (41.0-60)  L  19  05:55    


 


MCV  82.3 fl (80-99)   19  05:55    


 


MCH  27.0 pg (26.0-30.0)   19  05:55    


 


MCHC Differential  32.8 pg (28.0-36.0)   19  05:55    


 


RDW  16.6 % (11.5-20.0)   19  05:55    


 


Plt Count  289 Th/cmm (150-400)   19  05:55    


 


MPV  7.0 fl  19  05:55    


 


Add Manual Diff  YES   19  05:55    


 


Neutrophils %  84.7 % (40.0-80.0)  H  19  19:00    


 


Band Neutrophils %  0 % (0-10)   19  16:49    


 


Lymphocytes %  3.0 % (20.0-50.0)  L  19  19:00    


 


Monocytes %  10.8 % (2.0-10.0)  H  19  19:00    


 


Eosinophils %  1.0 % (0.0-5.0)   19  19:00    


 


Basophils %  0.5 % (0.0-2.0)   19  19:00    


 


Neutrophils (Manual)  84 % (40-80)  H  19  05:55    


 


Lymphocytes  10 % (20-50)  L  19  05:55    


 


Monocytes  6 % (2-10)   19  05:55    


 


Eosinophils  1 % (0-5)   19  16:49    


 


Basophils  0 % (0-3)   19  16:49    


 


Platelet Estimate  ADEQUATE  (NORMAL)   19  05:55    


 


PT  9.8 SECONDS (9.5-11.5)   19  19:00    


 


INR  0.94  (0.5-1.4)   19  19:00    


 


PTT (Actin FS)  31.0 SECONDS (26.0-38.0)   19  19:00    


 


D-Dimer  3700 ng/mL (100-400)  H  19  19:00    


 


Sodium  147 mEq/L (136-145)  H  19  05:55    


 


Potassium  4.3 mEq/L (3.5-5.1)   19  05:55    


 


Chloride  116 mEq/L ()  H  19  05:55    


 


Carbon Dioxide  17.3 mEq/L (21.0-31.0)  L  19  05:55    


 


Anion Gap  18.0  (7.0-16.0)  H  19  05:55    


 


BUN  73 mg/dL (7-25)  H  19  05:55    


 


Creatinine  5.6 mg/dL (0.7-1.3)  H*  19  05:55    


 


Est GFR ( Amer)  14.0 ml/min (>90)   19  05:55    


 


Est GFR (Non-Af Amer)  11.6 ml/min  19  05:55    


 


BUN/Creatinine Ratio  13.0   19  05:55    


 


Glucose  135 mg/dL ()  H  19  05:55    


 


POC Glucose  165 MG/DL (70 - 105)  H  19  12:05    


 


Whole Bld Lactic Acid  0.59 mmol/L (0.60-1.99)  L  19  19:00    


 


Calcium  8.5 mg/dL (8.6-10.3)  L  19  05:55    


 


Magnesium  2.5 mg/dL (1.9-2.7)   19  06:45    


 


Total Bilirubin  0.5 mg/dL (0.3-1.0)   19  06:45    


 


AST  16 U/L (13-39)   19  06:45    


 


ALT  22 U/L (7-52)   19  06:45    


 


Alkaline Phosphatase  191 U/L ()  H  19  06:45    


 


Ammonia  40 umol/L (16-53)   19  06:22    


 


Creatine Kinase  85 U/L ()   19  19:00    


 


Troponin I  0.02 ng/mL (0.01-0.05)   19  19:00    


 


B-Natriuretic Peptide  142.0 pg/mL (5.0-100.0)  H  19  19:00    


 


Total Protein  6.0 gm/dL (6.0-8.3)   19  06:45    


 


Albumin  2.4 gm/dL (4.2-5.5)  L  19  06:45    


 


Globulin  3.6 gm/dL  19  06:45    


 


Albumin/Globulin Ratio  0.7  (1.0-1.8)  L  19  06:45    


 


Triglycerides  219 mg/dL (<150)  H  19  19:00    


 


Cholesterol  73 mg/dL (<200)   19  19:00    


 


LDL Cholesterol Direct  18 mg/dL ()  L  19  19:00    


 


HDL Cholesterol  12 mg/dL (23-92)  L  19  19:00    


 


Amylase  50 U/L ()   19  19:00    


 


Lipase  39 U/L (11-82)   19  19:00    


 


Vitamin B12  569 pg/mL (232-1245)   19  06:22    


 


Folic Acid  >20.0 ng/mL (>3.0)   19  06:22    


 


TSH  0.89 uIU/ml (0.34-5.60)   19  06:22    


 


Urine Source  CLEAN C   19  21:26    


 


Urine Color  YELLOW   19  21:26    


 


Urine Clarity  HAZY  (CLEAR)   19  21:26    


 


Urine pH  8.5  (4.6 - 8.0)   19  21:26    


 


Ur Specific Gravity  1.020  (1.005-1.030)   19  21:26    


 


Urine Protein  >=300 mg/dL (NEGATIVE)   19  21:26    


 


Urine Glucose (UA)  NEGATIVE mg/dL (NEGATIVE)   19  21:    


 


Urine Ketones  NEGATIVE mg/dL (NEGATIVE)   19  21:26    


 


Urine Blood  MODERATE  (NEGATIVE)  H  19  21:26    


 


Urine Nitrate  POSITIVE  (NEGATIVE)  H  19  21:    


 


Urine Bilirubin  NEGATIVE  (NEGATIVE)   19  21:    


 


Urine Urobilinogen  0.2 E.U./dL (0.2 - 1.0)   19  21:26    


 


Ur Leukocyte Esterase  MODERATE  (NEGATIVE)  H  19  21:26    


 


Urine RBC  2-5 /hpf (0-5)  H  19  21:    


 


Urine WBC  10-25 /hpf (0-5)  H  19  21:26    


 


Ur Epithelial Cells  FEW /lpf (FEW)   19  21:26    


 


Urine Bacteria  1+ /hpf (NONE SEEN)  H  19  21:26    














- Physical Exam


Vitals and I&O: 


 Vital Signs











Temp  98.0 F   19 12:09


 


Pulse  115   19 12:09


 


Resp  23   19 12:09


 


BP  119/83   19 12:09


 


Pulse Ox  98   19 12:09








 Intake & Output











 19





 18:59 06:59 18:59


 


Intake Total 250.667 975.833 24.167


 


Output Total  1350 


 


Balance 250.667 -374.167 24.167


 


Weight (lbs)  141 lb 


 


Intake:   


 


  Intake, IV Amount 250.667 275.833 24.167


 


    D5-0.9%Ns 1,000 ml @ 40 250.667  





    mls/hr IV .Q24H [facility] Rx#:   





    [id number]   


 


    Piperacillin Sodium/  275.833 24.167





    Tazobact 2.25 gm In   





    Sodium Chloride 0.9% 100   





    ml @ 100 mls/hr IV Q8HR   





    [facility] Rx#:[id number]   


 


  Oral  700 


 


Output:   


 


  Urine  1350 


 


Other:   


 


  # Bowel Movements  0 


 


  Weight Source  Bedscale 











Active Medications: 


Current Medications





Acetaminophen (Tylenol 650mg Supp)  650 mg RC Q4HR PRN


   PRN Reason: MILD PAIN OR TEMP >100


   Stop: 19 23:21


   Last Admin: 19 20:51 Dose:  650 mg


Acetaminophen/Hydrocodone Bitart (Norco 5mg/325mg)  1 tab PO Q4H PRN


   PRN Reason: MODERATE PAIN (LEVEL 4-6)


   Stop: 19 23:21


   Last Admin: 19 06:43 Dose:  1 tab


Albuterol Sulfate (Albuterol 2.5mg/3ml Neb Ud)  2.5 mg HHN Q2HRT PRN


   PRN Reason: Shortness of Breath or Wheeze


   Stop: 19 23:25


Baclofen (Lioresal)  10 mg PO QID [facility]


   Stop: 19 08:59


   Last Admin: 19 08:42 Dose:  10 mg


Famotidine (Pepcid)  20 mg PO DAILY@0730 [facility]


   Stop: 19 07:29


   Last Admin: 19 06:43 Dose:  20 mg


Heparin Sodium (Porcine) (Heparin)  5,000 units SUBQ Q12HR [facility]


   Stop: 19 08:59


   Last Admin: 19 08:43 Dose:  Not Given


Piperacillin Sod/Tazobactam (Sod 2.25 gm/ Sodium Chloride)  100 mls @ 100 mls/

hr IV Q8HR [facility]


   Stop: 19 04:59


   Last Infusion: 19 07:11 Dose:  Infused


Dextrose/Sodium Chloride (D5-0.9%Ns)  1,000 mls @ 40 mls/hr IV .Q24H [facility]


   Stop: 19 11:44


   Last Admin: 19 14:06 Dose:  40 mls/hr


Insulin Aspart (Novolog)  0 units SUBQ ACHS [facility]; Protocol


   Stop: 19 07:29


   Last Admin: 19 12:26 Dose:  Not Given


Lactobacillus Rhamnosus (Culturelle 15b)  1 each PO DAILY [facility]


   Stop: 19 15:59


   Last Admin: 19 08:41 Dose:  1 each


Loperamide HCl (Imodium)  4 mg PO PRN PRN


   PRN Reason: Loose Stools


   Stop: 19 23:21


Magnesium Hydroxide (Milk Of Magnesia)  30 ml PO Q72H PRN


   PRN Reason: Constipation


   Stop: 19 23:21


Metoprolol Tartrate (Lopressor)  50 mg PO TID [facility]


   Stop: 19 08:59


   Last Admin: 19 08:41 Dose:  50 mg


Mineral Oil (Mineral Oil 30 Ml)  30 ml PO BID [facility]


   Stop: 19 16:59


   Last Admin: 19 08:40 Dose:  30 ml


Miscellaneous (Probiotic Screen)  1 [facility] PRN PRN


   PRN Reason: PROTOCOL


   Stop: 19 14:43


Miscellaneous (Amikacin Iv Per Pharmacy)  1 [facility] PRN PRN


   PRN Reason: PROTOCOL


   Stop: 19 15:54


Ondansetron HCl (Zofran)  4 mg IV Q8H PRN


   PRN Reason: Nausea / Vomiting


   Stop: 19 23:26


Sodium Phosphate (Fleet Enema)  135 ml RC PRN PRN


   PRN Reason: IF DULCOLAX INEFFECTIVE


   Stop: 19 23:21


   Last Admin: 19 05:05 Dose:  135 ml








General: congested, demented


HEENT: NC/AT, PERRLA, EOMI


Neck: Supple, No JVD, No thyromegaly


Lungs: congested, rales, ronchi


Cardiovascular: RRR, Normal S1, Normal S2


Abdomen: globular, distended, +GT, positive bowel sound


Extremities: excoriation, contracture





- Procedures


Procedures: 


 Procedures











Procedure Code Date


 


INSERT INDWELLING CATH 57.94 10/16/08


 


INSERT TEMP BLADDER CATH [id number] 10/16/08














Internal Medicine Assmt/Plan





- Assessment


Assessment: 





bacteremia, abd distention,  Urinary tract infection, sepsis, acute renal 

failure,


kidney stones, leukocytosis, metabolic acidosis, low albumin, cerebral palsy,


mental retardation, spastic quadriplegia, hypertension, cardiomyopathy,


hypercholesterolemia, status post PEG, elevated D-dimer.








- Plan


Plan: 


renal consult


24hour crea clearance and u/s renal 


continue ivabx


ivf for hydration


am labs


aspiration precautions


continue current plan of care 





Nutritional Asmnt/Malnutr-PDOC





- Dietary Evaluation


Malnutrition Findings (Please click <Entered> for more info): 








Nutritional Asmnt/Malnutrition                             Start:  19 16:

19


Text:                                                      Status: Complete    

  


Freq:                                                                          

  


Protocol:                                                                      

  


 Document     19 16:20  LCHENG  (Rec: 19 16:36  LCHENG  [id number])


 Nutritional Asmnt/Malnutrition


     Patient General Information


      Nutritional Screening                      High Risk


                                                 Consult


      Diagnosis                                  sepsis, UTI


      Pertinent Medical Hx/Surgical Hx           HTN, CVA/TIA, dyslipdiemia,


                                                 PUD/GERD, renal stone,


                                                 dementia, CP, osteoporosis


      Subjective Information                     Consult received for theresa 12


                                                 . Pt seen lying in bed at time


                                                 of visit, awake, non-verbal


                                                 noted. Spoke with CONCEPCION Rodriguez RN


                                                 reported pt ate well,


                                                 consumed 100% of lunch. Pt has


                                                 multiple renal stone noted


                                                 per nurse, urology doctor on


                                                 case.


      Current Diet Order/ Nutrition Support      JAMIE, vegerarian pureed, Two


                                                 Isai HN 1 can if PO<80%


      Pertinent Medications                      D5-0.9%ns, pepcid, novolog,


                                                 heparin, piperacillin, zofran


      Pertinent Labs                              BUN 65, Cr 5.0, gluocse


                                                 150, -222


                                                  Na 135, BUN 61, Cr 5.1,


                                                 Gluocse 145, alb 3.2


     Nutritional Hx/Data


      Height                                     5 ft 3 in


      Height (Calculated Centimeters)            160.0


      Current Weight (lbs)                       135 lb


      Weight (Calculated Kilograms)              61.2


      Weight (Calculated Grams)                  42103.0


      Ideal Body Weight                          124


      Body Mass Index (BMI)                      23.9


      Weight Status                              Approriate


     GI Symptoms


      GI Symptoms                                None


      Last BM                                    not indicated


      Difficult in:                              None


      Skin Integrity/Comment:                    reddened to right and left


                                                 buttocks


      Current %PO                                Good (%)


     Estimated Nutritional Goals


      BEE in Kcals:                              Using Current wt


      Calories/Kcals/Kg                          27-32


      Kcals Calculated                           4103-5366


      Protein:                                   Using Current wt


      Protein g/k.8


      Protein Calculated                         49


      Fluid: ml                                  per MD


     Nutritional Problem


      1. Problem


       Problem                                   altered nutrition related labs


       Etiology                                  renal dysfunction,


                                                 hyperglycemia


       Signs/Symptoms:                           BUN 65, Cr 5.0, gluocse 150,


                                                 -222


     Intervention/Recommendation


      Comments                                   1. Continue with vegetarian


                                                 pureed JAMIE diet with


                                                 supplemental of Two Isai HN as


                                                 ordered.


                                                 2. Will continue to monitor


                                                 renal labs and consider adjust


                                                 protein intake, however pt is


                                                 on vegetarian diet which not


                                                 providing a large mount of


                                                 protien at this time.


                                                 3. Monitor PO intake, wt, labs


                                                 and skin integrity


                                                 4. F/U as high risk in 2-3


                                                 days


     Expected Outcomes/Goals


      Expected Outcomes/Goals                    1. PO intake to meet at least


                                                 75% of nutritional needs.


                                                 2. Wt stability, skin to


                                                 remain intact, labs to


                                                 approach WNL.

## 2019-01-21 LAB
ANION GAP SERPL CALC-SCNC: 17.5 MMOL/L (ref 7–16)
BASOPHILS NFR BLD: 0 % (ref 0–3)
BUN SERPL-MCNC: 78 MG/DL (ref 7–25)
CALCIUM SERPL-MCNC: 8.4 MG/DL (ref 8.6–10.3)
CHLORIDE SERPL-SCNC: 119 MEQ/L (ref 98–107)
CO2 SERPL-SCNC: 17.9 MEQ/L (ref 21–31)
CREAT SERPL-MCNC: 5.4 MG/DL (ref 0.7–1.3)
CREAT SERPL-MCNC: 6.3 MG/DL (ref 0.7–1.3)
EOSINOPHIL NFR BLD: 1 % (ref 0–5)
ERYTHROCYTE [DISTWIDTH] IN BLOOD BY AUTOMATED COUNT: 16.6 % (ref 11.5–20)
GLUCOSE SERPL-MCNC: 132 MG/DL (ref 70–105)
HCT VFR BLD CALC: 27.6 % (ref 41–60)
HGB BLD-MCNC: 9.2 GM/DL (ref 12–16)
LYMPHOCYTES # BLD MANUAL: 5 % (ref 20–50)
MAGNESIUM SERPL-MCNC: 2.8 MG/DL (ref 1.9–2.7)
MCH RBC QN AUTO: 27 PG (ref 26–30)
MCHC RBC AUTO-ENTMCNC: 33.2 PG (ref 28–36)
MCV RBC AUTO: 81.4 FL (ref 80–99)
MONOCYTES # BLD MANUAL: 4 % (ref 2–10)
NEUTROPHILS NFR BLD AUTO: 90 % (ref 40–80)
NEUTS BAND NFR BLD: 0 % (ref 0–10)
PHOSPHATE SERPL-MCNC: 4.6 MG/DL (ref 2.5–5)
PLATELET # BLD EST: ADEQUATE 10*3/UL
PLATELET # BLD: 247 TH/CMM (ref 150–400)
PMV BLD AUTO: 7 FL
POTASSIUM SERPL-SCNC: 4.4 MEQ/L (ref 3.5–5.1)
RBC # BLD AUTO: 3.39 MIL/CMM (ref 4.3–5.7)
SODIUM SERPL-SCNC: 150 MEQ/L (ref 136–145)
SODIUM UR-SCNC: 55 MMOL/L
URATE SERPL-MCNC: 8.4 MG/DL (ref 4.4–7.6)
WBC # BLD AUTO: 25.6 TH/CMM (ref 4.8–10.8)

## 2019-01-21 RX ADMIN — INSULIN ASPART SCH: 100 INJECTION, SOLUTION INTRAVENOUS; SUBCUTANEOUS at 12:35

## 2019-01-21 RX ADMIN — SODIUM CHLORIDE SCH MLS/HR: 9 INJECTION, SOLUTION INTRAVENOUS at 12:58

## 2019-01-21 RX ADMIN — INSULIN ASPART SCH: 100 INJECTION, SOLUTION INTRAVENOUS; SUBCUTANEOUS at 17:43

## 2019-01-21 RX ADMIN — Medication SCH EACH: at 09:12

## 2019-01-21 RX ADMIN — Medication SCH TAB: at 09:12

## 2019-01-21 RX ADMIN — SODIUM CHLORIDE SCH MLS/HR: 9 INJECTION, SOLUTION INTRAVENOUS at 04:59

## 2019-01-21 RX ADMIN — INSULIN ASPART SCH: 100 INJECTION, SOLUTION INTRAVENOUS; SUBCUTANEOUS at 07:08

## 2019-01-21 NOTE — GENERAL PROGRESS NOTE
Subjective





- Review of Systems


Service Date: 19





Objective





- Results


Result Diagrams: 


 19 05:55





 19 05:55


Recent Labs: 


 Laboratory Last Values











WBC  18.3 Th/cmm (4.8-10.8)  H  19  05:55    


 


RBC  4.43 Mil/cmm (4.30-5.70)   19  05:55    


 


Hgb  12.0 gm/dL (12-16)   19  05:55    


 


Hct  36.5 % (41.0-60)  L  19  05:55    


 


MCV  82.3 fl (80-99)   19  05:55    


 


MCH  27.0 pg (26.0-30.0)   19  05:55    


 


MCHC Differential  32.8 pg (28.0-36.0)   19  05:55    


 


RDW  16.6 % (11.5-20.0)   19  05:55    


 


Plt Count  289 Th/cmm (150-400)   19  05:55    


 


MPV  7.0 fl  19  05:55    


 


Add Manual Diff  YES   19  05:55    


 


Neutrophils %  84.7 % (40.0-80.0)  H  19  19:00    


 


Band Neutrophils %  0 % (0-10)   19  16:49    


 


Lymphocytes %  3.0 % (20.0-50.0)  L  19  19:00    


 


Monocytes %  10.8 % (2.0-10.0)  H  19  19:00    


 


Eosinophils %  1.0 % (0.0-5.0)   19  19:00    


 


Basophils %  0.5 % (0.0-2.0)   19  19:00    


 


Neutrophils (Manual)  84 % (40-80)  H  19  05:55    


 


Lymphocytes  10 % (20-50)  L  19  05:55    


 


Monocytes  6 % (2-10)   19  05:55    


 


Eosinophils  1 % (0-5)   19  16:49    


 


Basophils  0 % (0-3)   19  16:49    


 


Platelet Estimate  ADEQUATE  (NORMAL)   19  05:55    


 


Eos Smear Source  URINE   19  18:00    


 


Eos Smear Total Cells  NONE SEEN  (NONE SEEN)   19  18:00    


 


PT  9.8 SECONDS (9.5-11.5)   19  19:00    


 


INR  0.94  (0.5-1.4)   19  19:00    


 


PTT (Actin FS)  31.0 SECONDS (26.0-38.0)   19  19:00    


 


D-Dimer  3700 ng/mL (100-400)  H  19  19:00    


 


Sodium  147 mEq/L (136-145)  H  19  05:55    


 


Potassium  4.3 mEq/L (3.5-5.1)   19  05:55    


 


Chloride  116 mEq/L ()  H  19  05:55    


 


Carbon Dioxide  17.3 mEq/L (21.0-31.0)  L  19  05:55    


 


Anion Gap  18.0  (7.0-16.0)  H  19  05:55    


 


BUN  73 mg/dL (7-25)  H  19  05:55    


 


Creatinine  5.6 mg/dL (0.7-1.3)  H*  19  05:55    


 


Est GFR ( Amer)  14.0 ml/min (>90)   19  05:55    


 


Est GFR (Non-Af Amer)  11.6 ml/min  19  05:55    


 


BUN/Creatinine Ratio  13.0   19  05:55    


 


Glucose  135 mg/dL ()  H  19  05:55    


 


POC Glucose  168 MG/DL (70 - 105)  H  19  20:21    


 


Whole Bld Lactic Acid  0.59 mmol/L (0.60-1.99)  L  19  19:00    


 


Calcium  8.5 mg/dL (8.6-10.3)  L  19  05:55    


 


Magnesium  2.5 mg/dL (1.9-2.7)   19  06:45    


 


Total Bilirubin  0.5 mg/dL (0.3-1.0)   19  06:45    


 


AST  16 U/L (13-39)   19  06:45    


 


ALT  22 U/L (7-52)   19  06:45    


 


Alkaline Phosphatase  191 U/L ()  H  19  06:45    


 


Ammonia  40 umol/L (16-53)   19  06:22    


 


Creatine Kinase  85 U/L ()   19  19:00    


 


Troponin I  0.02 ng/mL (0.01-0.05)   19  19:00    


 


B-Natriuretic Peptide  142.0 pg/mL (5.0-100.0)  H  19  19:00    


 


Total Protein  6.0 gm/dL (6.0-8.3)   19  06:45    


 


Albumin  2.4 gm/dL (4.2-5.5)  L  19  06:45    


 


Globulin  3.6 gm/dL  19  06:45    


 


Albumin/Globulin Ratio  0.7  (1.0-1.8)  L  19  06:45    


 


Triglycerides  219 mg/dL (<150)  H  19  19:00    


 


Cholesterol  73 mg/dL (<200)   19  19:00    


 


LDL Cholesterol Direct  18 mg/dL ()  L  19  19:00    


 


HDL Cholesterol  12 mg/dL (23-92)  L  19  19:00    


 


Amylase  50 U/L ()   19  19:00    


 


Lipase  39 U/L (11-82)   19  19:00    


 


Vitamin B12  569 pg/mL (232-1245)   19  06:22    


 


Folic Acid  >20.0 ng/mL (>3.0)   19  06:22    


 


TSH  0.89 uIU/ml (0.34-5.60)   19  06:22    


 


Urine Source  CLEAN C   19  21:26    


 


Urine Color  YELLOW   19  21:26    


 


Urine Clarity  HAZY  (CLEAR)   19  21:26    


 


Urine pH  8.5  (4.6 - 8.0)   19  21:26    


 


Ur Specific Gravity  1.020  (1.005-1.030)   19  21:26    


 


Urine Protein  >=300 mg/dL (NEGATIVE)   19  21:26    


 


Urine Glucose (UA)  NEGATIVE mg/dL (NEGATIVE)   19  21:    


 


Urine Ketones  NEGATIVE mg/dL (NEGATIVE)   19  21:26    


 


Urine Blood  MODERATE  (NEGATIVE)  H  19  21:26    


 


Urine Nitrate  POSITIVE  (NEGATIVE)  H  19  21:26    


 


Urine Bilirubin  NEGATIVE  (NEGATIVE)   19  21:26    


 


Urine Urobilinogen  0.2 E.U./dL (0.2 - 1.0)   19  21:26    


 


Ur Leukocyte Esterase  MODERATE  (NEGATIVE)  H  19  21:26    


 


Urine RBC  2-5 /hpf (0-5)  H  19  21:26    


 


Urine WBC  10-25 /hpf (0-5)  H  19  21:26    


 


Ur Epithelial Cells  FEW /lpf (FEW)   19  21:    


 


Urine Bacteria  1+ /hpf (NONE SEEN)  H  19  21:26    


 


Urine Creatinine  88.0 mg/dl (39.0-259.0)   19  18:00    














- Physical Exam


Vitals and I&O: 


 Vital Signs











Temp  99.7 F   19 23:59


 


Pulse  114   19 23:59


 


Resp  19   19 00:00


 


BP  155/89   19 23:59


 


Pulse Ox  96   19 23:59








 Intake & Output











 19





 06:59 18:59 06:59


 


Intake Total 671.612 1326.500 


 


Output Total 1350 500 


 


Balance -374.167 847.500 


 


Weight (lbs) 63.957 kg 63.957 kg 


 


Intake:   


 


  Intake, IV Amount 560.473 9865.500 


 


    D5-0.9%Ns 1,000 ml @ 40  923.333 





    mls/hr IV .Q24H [facility] Rx#:   





    [id number]   


 


    Piperacillin Sodium/ 275.833 124.167 





    Tazobact 2.25 gm In   





    Sodium Chloride 0.9% 100   





    ml @ 100 mls/hr IV Q8HR   





    [facility] Rx#:[id number]   


 


  Oral 700 300 


 


Output:   


 


  Urine 1350 500 


 


Other:   


 


  # Bowel Movements 0  


 


  Weight Source Bedscale Bedscale 











Active Medications: 


Current Medications





Acetaminophen (Tylenol 650mg Supp)  650 mg RC Q4HR PRN


   PRN Reason: MILD PAIN OR TEMP >100


   Stop: 19 23:21


   Last Admin: 19 20:51 Dose:  650 mg


Acetaminophen/Hydrocodone Bitart (Norco 5mg/325mg)  1 tab PO Q4H PRN


   PRN Reason: MODERATE PAIN (LEVEL 4-6)


   Stop: 19 23:21


   Last Admin: 19 22:31 Dose:  1 tab


Albuterol Sulfate (Albuterol 2.5mg/3ml Neb Ud)  2.5 mg HHN Q2HRT PRN


   PRN Reason: Shortness of Breath or Wheeze


   Stop: 19 23:25


Baclofen (Lioresal)  10 mg PO QID [facility]


   Stop: 19 08:59


   Last Admin: 19 20:29 Dose:  10 mg


Famotidine (Pepcid)  20 mg PO DAILY@0730 [facility]


   Stop: 19 07:29


   Last Admin: 19 06:43 Dose:  20 mg


Heparin Sodium (Porcine) (Heparin)  5,000 units SUBQ Q12HR [facility]


   Stop: 19 08:59


   Last Admin: 19 20:41 Dose:  Not Given


Piperacillin Sod/Tazobactam (Sod 2.25 gm/ Sodium Chloride)  100 mls @ 100 mls/

hr IV Q8HR [facility]


   Stop: 19 04:59


   Last Admin: 19 20:32 Dose:  100 mls/hr


Dextrose/Sodium Chloride (D5-0.45ns)  1,000 mls @ 40 mls/hr IV .Q24H [facility]


   Stop: 19 16:29


   Last Admin: 19 16:29 Dose:  40 mls/hr


Insulin Aspart (Novolog)  0 units SUBQ ACHS [facility]; Protocol


   Stop: 19 07:29


   Last Admin: 19 20:41 Dose:  Not Given


Lactobacillus Rhamnosus (Culturelle 15b)  1 each PO DAILY [facility]


   Stop: 19 15:59


   Last Admin: 19 08:41 Dose:  1 each


Magnesium Hydroxide (Milk Of Magnesia)  30 ml PO Q72H PRN


   PRN Reason: Constipation


   Stop: 19 23:21


Metoprolol Tartrate (Lopressor)  50 mg PO TID [facility]


   Stop: 19 08:59


   Last Admin: 19 20:28 Dose:  50 mg


Mineral Oil (Mineral Oil 30 Ml)  30 ml PO BID [facility]


   Stop: 19 16:59


   Last Admin: 19 16:28 Dose:  30 ml


Miscellaneous (Probiotic Screen)  1 ea  PRN PRN


   PRN Reason: PROTOCOL


   Stop: 19 14:43


Miscellaneous (Amikacin Iv Per Pharmacy)  1 ea  PRN PRN


   PRN Reason: PROTOCOL


   Stop: 19 15:54


Ondansetron HCl (Zofran)  4 mg IV Q8H PRN


   PRN Reason: Nausea / Vomiting


   Stop: 19 23:26


Sodium Bicarbonate (Sodium Bicarbonate)  650 mg PO BID [facility]; Protocol


   Stop: 19 16:59


   Last Admin: 19 16:51 Dose:  650 mg


Sodium Phosphate (Fleet Enema)  135 ml RC PRN PRN


   PRN Reason: IF DULCOLAX INEFFECTIVE


   Stop: 19 23:21


   Last Admin: 19 05:05 Dose:  135 ml











- Procedures


Procedures: 


 Procedures











Procedure Code Date


 


INSERT INDWELLING CATH 57.94 10/16/08


 


INSERT TEMP BLADDER CATH 71720 10/16/08














Assessment/Plan





- Problem List


Patient Problems: 


All Active Problems





VOMITING (Acute) 











- Assessment


Assessment: 


48M


acute / chronic renal insufficiency


multiple medical problems


Dr. Costello nephrologist recomends HD


plan for HD cath placement tomorrow night / earliest time I am available if 

family agreeable/informed consent obtained.





- Plan


Plan: 





HD cath placement tomorrow night.


informed consent





Nutritional Asmnt/Malnutr-PDOC





- Dietary Evaluation


Malnutrition Findings (Please click <Entered> for more info): 








Nutritional Asmnt/Malnutrition                             Start:  19 16:

19


Text:                                                      Status: Complete    

  


Freq:                                                                          

  


Protocol:                                                                      

  


 Document     19 16:20  [facility]  (Rec: 19 16:36  SONAG  [id number])


 Nutritional Asmnt/Malnutrition


     Patient General Information


      Nutritional Screening                      High Risk


                                                 Consult


      Diagnosis                                  sepsis, UTI


      Pertinent Medical Hx/Surgical Hx           HTN, CVA/TIA, dyslipdiemia,


                                                 PUD/GERD, renal stone,


                                                 dementia, CP, osteoporosis


      Subjective Information                     Consult received for theresa 12


                                                 . Pt seen lying in bed at time


                                                 of visit, awake, non-verbal


                                                 noted. Spoke with RN Michael, RN


                                                 reported pt ate well,


                                                 consumed 100% of lunch. Pt has


                                                 multiple renal stone noted


                                                 per nurse, urology doctor on


                                                 case.


      Current Diet Order/ Nutrition Support      JAMIE, vegerarian pureed, Two


                                                 Isai HN 1 can if PO<80%


      Pertinent Medications                      D5-0.9%ns, pepcid, novolog,


                                                 heparin, piperacillin, zofran


      Pertinent Labs                              BUN 65, Cr 5.0, gluocse


                                                 150, -222


                                                  Na 135, BUN 61, Cr 5.1,


                                                 Gluocse 145, alb 3.2


     Nutritional Hx/Data


      Height                                     1.6 m


      Height (Calculated Centimeters)            160.0


      Current Weight (lbs)                       61.235 kg


      Weight (Calculated Kilograms)              61.2


      Weight (Calculated Grams)                  16211.0


      Ideal Body Weight                          124


      Body Mass Index (BMI)                      23.9


      Weight Status                              Approriate


     GI Symptoms


      GI Symptoms                                None


      Last BM                                    not indicated


      Difficult in:                              None


      Skin Integrity/Comment:                    reddened to right and left


                                                 buttocks


      Current %PO                                Good (%)


     Estimated Nutritional Goals


      BEE in Kcals:                              Using Current wt


      Calories/Kcals/Kg                          27-32


      Kcals Calculated                           9892-9615


      Protein:                                   Using Current wt


      Protein g/k.8


      Protein Calculated                         49


      Fluid: ml                                  per MD


     Nutritional Problem


      1. Problem


       Problem                                   altered nutrition related labs


       Etiology                                  renal dysfunction,


                                                 hyperglycemia


       Signs/Symptoms:                           BUN 65, Cr 5.0, gluocse 150,


                                                 -222


     Intervention/Recommendation


      Comments                                   1. Continue with vegetarian


                                                 pureed JAMIE diet with


                                                 supplemental of Two Isai HN as


                                                 ordered.


                                                 2. Will continue to monitor


                                                 renal labs and consider adjust


                                                 protein intake, however pt is


                                                 on vegetarian diet which not


                                                 providing a large mount of


                                                 protien at this time.


                                                 3. Monitor PO intake, wt, labs


                                                 and skin integrity


                                                 4. F/U as high risk in 2-3


                                                 days


     Expected Outcomes/Goals


      Expected Outcomes/Goals                    1. PO intake to meet at least


                                                 75% of nutritional needs.


                                                 2. Wt stability, skin to


                                                 remain intact, labs to


                                                 approach WNL.

## 2019-01-21 NOTE — DIAGNOSTIC IMAGING REPORT
Radionuclide biliary scan (HIDA scan)



HISTORY: Pain



5.3 mCi technetium labeled nuclide agent used in the exam.



There is normal hepatic uptake and clearance.  There is normal excretion

of nuclide into the gallbladder, common bile duct, and small bowel.



IMPRESSION: Negative examination.

## 2019-01-21 NOTE — CONSULTATION
DATE OF CONSULTATION:  01/21/2019



SURGICAL CONSULTATION



TIME:  07:57 p.m.



HISTORY OF PRESENT ILLNESS:  The patient is a patient who came in with nausea,

vomiting, diarrhea, flank pain, abdominal pain.  The patient with worsening

renal function.  The patient was seen by Nephrology, Dr. Costello, who discussed

with the patient possible need for dialysis catheter placement and requested for

dialysis catheter.  The patient is mentally handicapped, mentally retarded,

history of kidney stones, cerebral palsy, spastic quadriplegia, hypertension,

cardiomyopathy, G-tube secondary to dysphagia.



ALLERGIES:  SULFA.



SOCIAL HISTORY:  Lives in a nursing home requiring 24-hour total care.



PHYSICAL EXAMINATION:

VITAL SIGNS:  Afebrile, vital signs stable.  81 kilograms.  BMI 31.9,

encephalopathic, cerebral palsy, spastic quadriplegic.

CHEST:  Clear to auscultation bilaterally.

CARDIOVASCULAR:  Regular rate.

ABDOMEN:  Protuberant, but soft, nontender, nondistended.  G-tube is in place. 

He has a Zhao catheter.

NEUROVASCULAR:  Otherwise normal.  He is on IV fluids.



LABORATORY DATA:  White blood cell count 25.6, H and H is 9 and 27.6, platelet

count 247.  Sodium 150, potassium 4.4, chloride 119, BUN and creatinine is 78

and 6.3.  The patient on heparin 5000 units subcutaneous q. 12 hours, Zosyn.



IMPRESSION/PLAN:  After further discussion with family.  It appears that the

mother has decided that she is not consenting for hemodialysis catheter

placement and hemodialysis.  The patient is being planned for transfer to a

skilled nursing facility or long-term acute care setting.  Therefore, the

consultation for hemodialysis catheter placement canceled.





DD: 01/21/2019 20:01

DT: 01/21/2019 20:40

JOB# [id number]  [id number]

## 2019-01-21 NOTE — DISCHARGE SUMMARY
DATE OF DISCHARGE:  01/21/2019



CHIEF COMPLAINT:  Abdominal pain.



FINAL DIAGNOSES:  Urinary tract infection, sepsis, acute renal failure,

extensive kidney stones, leukocytosis, metabolic acidosis, low albumin, cerebral

palsy, mental retardation, spastic quadriplegia, hypertension, cardiomyopathy,

hypercholesterolemia, status post PEG.



HISTORY:  This is a 48-year-old unfortunate  male with history of

cerebral palsy, mental retardation, known kidney stone, admitted secondary to

abdominal pain.  The patient was admitted to telemetry.



PHYSICAL EXAMINATION:

VITAL SIGNS:  Blood pressure 120/70, respirations 18, pulse 81, temperature

97.1.

GENERAL:  Elderly male, appears chronically ill.

NECK:  Supple.  No mass.

LUNGS:  Equal breath sounds, few rhonchi.

HEART:  Regular rate and rhythm without appreciable murmur.

ABDOMEN:  Soft, globular.

EXTREMITIES:  Positive excoriation.

NEUROLOGIC:  Limited.



HOSPITAL COURSE:  The patient was admitted to telemetry.  Medication given IV

antibiotic and IV hydration.  The patient was referred to Dr. Gordon ____ any

surgical procedure.  The patient will need extensive surgery and possible

percutaneous nephrostomy.  This was discussed with Dr. Gordon with the mother as

well as my discussion with the mother.  She wanted conservative management.  Dr. Costello for Nephrology suggested hemodialysis, but family does not wish for

dialysis ____ the patient for possible Ryder catheter placement.  Dr. Gonzales

for GI.  The patient's family wanted conservative management.



CONDITION ON DISCHARGE:  Fair.



DISCHARGE INSTRUCTIONS:  The patient to be referred to [facility].  Continue

current treatment.  Blood culture was positive for pseudomonas.  Urine positive

for these.





DD: 01/21/2019 15:37

DT: 01/21/2019 20:13

JOB# [id number]  [id number]

## 2019-01-21 NOTE — GI PROGRESS NOTE
Subjective





- Review of Systems


Subjective: 





NO EVENTS





Objective





- Results


Result Diagrams: 


 01/21/19 04:30





 01/21/19 04:30


Recent Labs: 


 Laboratory Last Values











WBC  25.6 Th/cmm (4.8-10.8)  H*  01/21/19  04:30    


 


RBC  3.39 Mil/cmm (4.30-5.70)  L  01/21/19  04:30    


 


Hgb  9.2 gm/dL (12-16)  L  01/21/19  04:30    


 


Hct  27.6 % (41.0-60)  L D 01/21/19  04:30    


 


MCV  81.4 fl (80-99)   01/21/19  04:30    


 


MCH  27.0 pg (26.0-30.0)   01/21/19  04:30    


 


MCHC Differential  33.2 pg (28.0-36.0)   01/21/19  04:30    


 


RDW  16.6 % (11.5-20.0)   01/21/19  04:30    


 


Plt Count  247 Th/cmm (150-400)   01/21/19  04:30    


 


MPV  7.0 fl  01/21/19  04:30    


 


Add Manual Diff  YES   01/21/19  04:30    


 


Neutrophils %  84.7 % (40.0-80.0)  H  01/16/19  19:00    


 


Band Neutrophils %  0 % (0-10)   01/21/19  04:30    


 


Lymphocytes %  3.0 % (20.0-50.0)  L  01/16/19  19:00    


 


Monocytes %  10.8 % (2.0-10.0)  H  01/16/19  19:00    


 


Eosinophils %  1.0 % (0.0-5.0)   01/16/19  19:00    


 


Basophils %  0.5 % (0.0-2.0)   01/16/19  19:00    


 


Neutrophils (Manual)  90 % (40-80)  H  01/21/19  04:30    


 


Lymphocytes  5 % (20-50)  L  01/21/19  04:30    


 


Monocytes  4 % (2-10)   01/21/19  04:30    


 


Eosinophils  1 % (0-5)   01/21/19  04:30    


 


Basophils  0 % (0-3)   01/21/19  04:30    


 


Platelet Estimate  ADEQUATE  (NORMAL)   01/21/19  04:30    


 


Eos Smear Source  URINE   01/20/19  18:00    


 


Eos Smear Total Cells  NONE SEEN  (NONE SEEN)   01/20/19  18:00    


 


PT  9.8 SECONDS (9.5-11.5)   01/16/19  19:00    


 


INR  0.94  (0.5-1.4)   01/16/19  19:00    


 


PTT (Actin FS)  31.0 SECONDS (26.0-38.0)   01/16/19  19:00    


 


D-Dimer  3700 ng/mL (100-400)  H  01/16/19  19:00    


 


Sodium  150 mEq/L (136-145)  H  01/21/19  04:30    


 


Potassium  4.4 mEq/L (3.5-5.1)   01/21/19  04:30    


 


Chloride  119 mEq/L ()  H  01/21/19  04:30    


 


Carbon Dioxide  17.9 mEq/L (21.0-31.0)  L  01/21/19  04:30    


 


Anion Gap  17.5  (7.0-16.0)  H  01/21/19  04:30    


 


BUN  78 mg/dL (7-25)  H  01/21/19  04:30    


 


Creatinine  6.3 mg/dL (0.7-1.3)  H*  01/21/19  04:30    


 


Est GFR ( Amer)  12.2 ml/min (>90)   01/21/19  04:30    


 


Est GFR (Non-Af Amer)  10.1 ml/min  01/21/19  04:30    


 


BUN/Creatinine Ratio  12.4   01/21/19  04:30    


 


Glucose  132 mg/dL ()  H  01/21/19  04:30    


 


POC Glucose  130 MG/DL (70 - 105)  H  01/21/19  06:26    


 


Whole Bld Lactic Acid  0.59 mmol/L (0.60-1.99)  L  01/16/19  19:00    


 


Uric Acid  8.4 mg/dL (4.4-7.6)  H  01/21/19  04:30    


 


Calcium  8.4 mg/dL (8.6-10.3)  L  01/21/19  04:30    


 


Phosphorus  4.6 mg/dL (2.5-5.0)   01/21/19  04:30    


 


Magnesium  2.8 mg/dL (1.9-2.7)  H  01/21/19  04:30    


 


Total Bilirubin  0.5 mg/dL (0.3-1.0)   01/19/19  06:45    


 


AST  16 U/L (13-39)   01/19/19  06:45    


 


ALT  22 U/L (7-52)   01/19/19  06:45    


 


Alkaline Phosphatase  191 U/L ()  H  01/19/19  06:45    


 


Ammonia  40 umol/L (16-53)   01/17/19  06:22    


 


Creatine Kinase  85 U/L ()   01/16/19  19:00    


 


Troponin I  0.02 ng/mL (0.01-0.05)   01/16/19  19:00    


 


B-Natriuretic Peptide  142.0 pg/mL (5.0-100.0)  H  01/16/19  19:00    


 


Total Protein  6.0 gm/dL (6.0-8.3)   01/19/19  06:45    


 


Albumin  2.4 gm/dL (4.2-5.5)  L  01/19/19  06:45    


 


Globulin  3.6 gm/dL  01/19/19  06:45    


 


Albumin/Globulin Ratio  0.7  (1.0-1.8)  L  01/19/19  06:45    


 


Triglycerides  219 mg/dL (<150)  H  01/16/19  19:00    


 


Cholesterol  73 mg/dL (<200)   01/16/19  19:00    


 


LDL Cholesterol Direct  18 mg/dL ()  L  01/16/19  19:00    


 


HDL Cholesterol  12 mg/dL (23-92)  L  01/16/19  19:00    


 


Amylase  50 U/L ()   01/16/19  19:00    


 


Lipase  39 U/L (11-82)   01/16/19  19:00    


 


Vitamin B12  569 pg/mL (232-1245)   01/17/19  06:22    


 


Folic Acid  >20.0 ng/mL (>3.0)   01/17/19  06:22    


 


TSH  0.89 uIU/ml (0.34-5.60)   01/17/19  06:22    


 


Urine Source  CLEAN C   01/16/19  21:26    


 


Urine Color  YELLOW   01/16/19  21:26    


 


Urine Clarity  HAZY  (CLEAR)   01/16/19  21:26    


 


Urine pH  8.5  (4.6 - 8.0)   01/16/19  21:26    


 


Ur Specific Gravity  1.020  (1.005-1.030)   01/16/19  21:26    


 


Urine Protein  >=300 mg/dL (NEGATIVE)   01/16/19  21:26    


 


Urine Glucose (UA)  NEGATIVE mg/dL (NEGATIVE)   01/16/19  21:26    


 


Urine Ketones  NEGATIVE mg/dL (NEGATIVE)   01/16/19  21:26    


 


Urine Blood  MODERATE  (NEGATIVE)  H  01/16/19  21:26    


 


Urine Nitrate  POSITIVE  (NEGATIVE)  H  01/16/19  21:26    


 


Urine Bilirubin  NEGATIVE  (NEGATIVE)   01/16/19  21:26    


 


Urine Urobilinogen  0.2 E.U./dL (0.2 - 1.0)   01/16/19  21:26    


 


Ur Leukocyte Esterase  MODERATE  (NEGATIVE)  H  01/16/19  21:26    


 


Urine RBC  2-5 /hpf (0-5)  H  01/16/19  21:26    


 


Urine WBC  10-25 /hpf (0-5)  H  01/16/19  21:26    


 


Ur Epithelial Cells  FEW /lpf (FEW)   01/16/19  21:26    


 


Urine Bacteria  1+ /hpf (NONE SEEN)  H  01/16/19  21:26    


 


Ur Random Sodium  55 mmol/L  01/20/19  18:00    


 


Urine Creatinine  88.0 mg/dl (39.0-259.0)   01/20/19  18:00    


 


Random Amikacin  9.2 ug/ml (1.0-30.0)   01/19/19  21:00    














- Physical Exam


Vitals and I&O: 


 Vital Signs











Temp  96.9 F   01/21/19 07:48


 


Pulse  89   01/21/19 09:13


 


Resp  19   01/21/19 07:48


 


BP  123/89   01/21/19 09:13


 


Pulse Ox  98   01/21/19 07:48








 Intake & Output











 01/20/19 01/21/19 01/21/19





 18:59 06:59 18:59


 


Intake Total 1347.500 100 


 


Output Total 500 490 


 


Balance 847.500 -390 


 


Weight (lbs) 63.957 kg 81.873 kg 


 


Intake:   


 


  Intake, IV Amount 1047.500 100 


 


    D5-0.9%Ns 1,000 ml @ 40 923.333  





    mls/hr IV .Q24H [facility] Rx#:   





    [id number]   


 


    Piperacillin Sodium/ 124.167 100 





    Tazobact 2.25 gm In   





    Sodium Chloride 0.9% 100   





    ml @ 100 mls/hr IV Q8HR   





    [facility] Rx#:[id number]   


 


  Oral 300  


 


Output:   


 


  Urine 500 490 


 


Other:   


 


  Weight Source Bedscale Bedscale 











Active Medications: 


Current Medications





Acetaminophen (Tylenol 650mg Supp)  650 mg RC Q4HR PRN


   PRN Reason: MILD PAIN OR TEMP >100


   Stop: 03/17/19 23:21


   Last Admin: 01/17/19 20:51 Dose:  650 mg


Acetaminophen/Hydrocodone Bitart (Norco 5mg/325mg)  1 tab PO Q4H PRN


   PRN Reason: MODERATE PAIN (LEVEL 4-6)


   Stop: 03/17/19 23:21


   Last Admin: 01/20/19 22:31 Dose:  1 tab


Albuterol Sulfate (Albuterol 2.5mg/3ml Neb Ud)  2.5 mg HHN Q2HRT PRN


   PRN Reason: Shortness of Breath or Wheeze


   Stop: 03/17/19 23:25


Baclofen (Lioresal)  10 mg PO QID [facility]


   Stop: 03/18/19 08:59


   Last Admin: 01/21/19 09:12 Dose:  10 mg


Famotidine (Pepcid)  20 mg PO DAILY@0730 [facility]


   Stop: 03/18/19 07:29


   Last Admin: 01/21/19 06:44 Dose:  20 mg


Heparin Sodium (Porcine) (Heparin)  5,000 units SUBQ Q12HR [facility]


   Stop: 03/18/19 08:59


   Last Admin: 01/21/19 09:13 Dose:  5,000 units


Piperacillin Sod/Tazobactam (Sod 2.25 gm/ Sodium Chloride)  100 mls @ 100 mls/

hr IV Q8HR [facility]


   Stop: 03/18/19 04:59


   Last Admin: 01/21/19 04:59 Dose:  100 mls/hr


Dextrose/Sodium Chloride (D5-0.45ns)  1,000 mls @ 40 mls/hr IV .Q24H [facility]


   Stop: 03/21/19 16:29


   Last Admin: 01/20/19 16:29 Dose:  40 mls/hr


Amikacin Sulfate 500 mg/ (Dextrose)  102 mls @ 100 mls/hr IV ONCE ONE


   Stop: 01/21/19 10:31


   Last Admin: 01/21/19 09:35 Dose:  100 mls/hr


Insulin Aspart (Novolog)  0 units SUBQ ACHS [facility]; Protocol


   Stop: 03/18/19 07:29


   Last Admin: 01/21/19 07:08 Dose:  Not Given


Lactobacillus Rhamnosus (Culturelle 15b)  1 each PO DAILY [facility]


   Stop: 03/19/19 15:59


   Last Admin: 01/21/19 09:12 Dose:  1 each


Magnesium Hydroxide (Milk Of Magnesia)  30 ml PO Q72H PRN


   PRN Reason: Constipation


   Stop: 03/17/19 23:21


Metoprolol Tartrate (Lopressor)  50 mg PO TID [facility]


   Stop: 03/21/19 08:59


   Last Admin: 01/21/19 09:13 Dose:  50 mg


Mineral Oil (Mineral Oil 30 Ml)  30 ml PO BID [facility]


   Stop: 03/19/19 16:59


   Last Admin: 01/21/19 09:13 Dose:  30 ml


Miscellaneous (Probiotic Screen)  1 ea  PRN PRN


   PRN Reason: PROTOCOL


   Stop: 03/19/19 14:43


Miscellaneous (Amikacin Iv Per Pharmacy)  1 ea  PRN PRN


   PRN Reason: PROTOCOL


   Stop: 03/19/19 15:54


Ondansetron HCl (Zofran)  4 mg IV Q8H PRN


   PRN Reason: Nausea / Vomiting


   Stop: 03/17/19 23:26


Sodium Bicarbonate (Sodium Bicarbonate)  650 mg PO BID [facility]; Protocol


   Stop: 03/21/19 16:59


   Last Admin: 01/21/19 09:12 Dose:  650 mg


Sodium Phosphate (Fleet Enema)  135 ml  PRN PRN


   PRN Reason: IF DULCOLAX INEFFECTIVE


   Stop: 03/17/19 23:21


   Last Admin: 01/18/19 05:05 Dose:  135 ml











- Procedures


Procedures: 


 Procedures











Procedure Code Date


 


INSERT INDWELLING CATH 57.94 10/16/08


 


INSERT TEMP BLADDER CATH 29347 10/16/08














Assessment/Plan





- Problem List


Patient Problems: 


All Active Problems





VOMITING (Acute) 











- Assessment


Assessment: 





47 YO MALE WITH ABD DISTENSION DUE TO ILEUS


PT ALSO HAS GALLSTONES AND HIDA IS NEG 


CAUSE OF ILEUS LIKELY DUE TO UNDERLYING URINARY OBSTRUCTION AND INFECTION





1.MINERAL OIL


2.NEEDS UROLOGY INPUT


3.ALEA IF SX'S

## 2019-01-21 NOTE — GENERAL PROGRESS NOTE
Subjective





- Review of Systems


Service Date: 19


Subjective: 





awake, remains nonverbal





Objective





- Results


Result Diagrams: 


 19 04:30





 19 04:30


Recent Labs: 


 Laboratory Last Values











WBC  25.6 Th/cmm (4.8-10.8)  H*  19  04:30    


 


RBC  3.39 Mil/cmm (4.30-5.70)  L  19  04:30    


 


Hgb  9.2 gm/dL (12-16)  L  19  04:30    


 


Hct  27.6 % (41.0-60)  L D 19  04:30    


 


MCV  81.4 fl (80-99)   19  04:30    


 


MCH  27.0 pg (26.0-30.0)   19  04:30    


 


MCHC Differential  33.2 pg (28.0-36.0)   19  04:30    


 


RDW  16.6 % (11.5-20.0)   19  04:30    


 


Plt Count  247 Th/cmm (150-400)   19  04:30    


 


MPV  7.0 fl  19  04:30    


 


Add Manual Diff  YES   19  04:30    


 


Neutrophils %  84.7 % (40.0-80.0)  H  19  19:00    


 


Band Neutrophils %  0 % (0-10)   19  04:30    


 


Lymphocytes %  3.0 % (20.0-50.0)  L  19  19:00    


 


Monocytes %  10.8 % (2.0-10.0)  H  19  19:00    


 


Eosinophils %  1.0 % (0.0-5.0)   19  19:00    


 


Basophils %  0.5 % (0.0-2.0)   19  19:00    


 


Neutrophils (Manual)  90 % (40-80)  H  19  04:30    


 


Lymphocytes  5 % (20-50)  L  19  04:30    


 


Monocytes  4 % (2-10)   19  04:30    


 


Eosinophils  1 % (0-5)   19  04:30    


 


Basophils  0 % (0-3)   19  04:30    


 


Platelet Estimate  ADEQUATE  (NORMAL)   19  04:30    


 


Eos Smear Source  URINE   19  18:00    


 


Eos Smear Total Cells  NONE SEEN  (NONE SEEN)   19  18:00    


 


PT  9.8 SECONDS (9.5-11.5)   19  19:00    


 


INR  0.94  (0.5-1.4)   19  19:00    


 


PTT (Actin FS)  31.0 SECONDS (26.0-38.0)   19  19:00    


 


D-Dimer  3700 ng/mL (100-400)  H  19  19:00    


 


Sodium  150 mEq/L (136-145)  H  19  04:30    


 


Potassium  4.4 mEq/L (3.5-5.1)   19  04:30    


 


Chloride  119 mEq/L ()  H  19  04:30    


 


Carbon Dioxide  17.9 mEq/L (21.0-31.0)  L  19  04:30    


 


Anion Gap  17.5  (7.0-16.0)  H  19  04:30    


 


BUN  78 mg/dL (7-25)  H  19  04:30    


 


Creatinine  6.3 mg/dL (0.7-1.3)  H*  19  04:30    


 


Est GFR ( Amer)  12.2 ml/min (>90)   19  04:30    


 


Est GFR (Non-Af Amer)  10.1 ml/min  19  04:30    


 


BUN/Creatinine Ratio  12.4   19  04:30    


 


Glucose  132 mg/dL ()  H  19  04:30    


 


POC Glucose  185 MG/DL (70 - 105)  H  19  12:05    


 


Whole Bld Lactic Acid  0.59 mmol/L (0.60-1.99)  L  19  19:00    


 


Uric Acid  8.4 mg/dL (4.4-7.6)  H  19  04:30    


 


Calcium  8.4 mg/dL (8.6-10.3)  L  19  04:30    


 


Phosphorus  4.6 mg/dL (2.5-5.0)   19  04:30    


 


Magnesium  2.8 mg/dL (1.9-2.7)  H  19  04:30    


 


Total Bilirubin  0.5 mg/dL (0.3-1.0)   19  06:45    


 


AST  16 U/L (13-39)   19  06:45    


 


ALT  22 U/L (7-52)   19  06:45    


 


Alkaline Phosphatase  191 U/L ()  H  19  06:45    


 


Ammonia  40 umol/L (16-53)   19  06:22    


 


Creatine Kinase  85 U/L ()   19  19:00    


 


Troponin I  0.02 ng/mL (0.01-0.05)   19  19:00    


 


B-Natriuretic Peptide  142.0 pg/mL (5.0-100.0)  H  19  19:00    


 


Total Protein  6.0 gm/dL (6.0-8.3)   19  06:45    


 


Albumin  2.4 gm/dL (4.2-5.5)  L  19  06:45    


 


Globulin  3.6 gm/dL  19  06:45    


 


Albumin/Globulin Ratio  0.7  (1.0-1.8)  L  19  06:45    


 


Triglycerides  219 mg/dL (<150)  H  19  19:00    


 


Cholesterol  73 mg/dL (<200)   19  19:00    


 


LDL Cholesterol Direct  18 mg/dL ()  L  19  19:00    


 


HDL Cholesterol  12 mg/dL (23-92)  L  19  19:00    


 


Amylase  50 U/L ()   19  19:00    


 


Lipase  39 U/L (11-82)   19  19:00    


 


Vitamin B12  569 pg/mL (232-1245)   19  06:22    


 


Folic Acid  >20.0 ng/mL (>3.0)   19  06:22    


 


TSH  0.89 uIU/ml (0.34-5.60)   19  06:22    


 


Urine Source  CLEAN C   19  21:26    


 


Urine Color  YELLOW   19  21:26    


 


Urine Clarity  HAZY  (CLEAR)   19  21:26    


 


Urine pH  8.5  (4.6 - 8.0)   19  21:    


 


Ur Specific Gravity  1.020  (1.005-1.030)   19  21:    


 


Urine Protein  >=300 mg/dL (NEGATIVE)   19  21:    


 


Urine Glucose (UA)  NEGATIVE mg/dL (NEGATIVE)   19  21:    


 


Urine Ketones  NEGATIVE mg/dL (NEGATIVE)   19  21:    


 


Urine Blood  MODERATE  (NEGATIVE)  H  19  21:    


 


Urine Nitrate  POSITIVE  (NEGATIVE)  H  19  21:    


 


Urine Bilirubin  NEGATIVE  (NEGATIVE)   19  21:    


 


Urine Urobilinogen  0.2 E.U./dL (0.2 - 1.0)   19  21:    


 


Ur Leukocyte Esterase  MODERATE  (NEGATIVE)  H  19  21:    


 


Urine RBC  2-5 /hpf (0-5)  H  19  21:    


 


Urine WBC  10-25 /hpf (0-5)  H  19  21:    


 


Ur Epithelial Cells  FEW /lpf (FEW)   19  21:    


 


Urine Bacteria  1+ /hpf (NONE SEEN)  H  19  21:    


 


Ur Random Sodium  55 mmol/L  19  18:00    


 


Urine Creatinine  88.0 mg/dl (39.0-259.0)   19  18:00    


 


Random Amikacin  9.2 ug/ml (1.0-30.0)   19  21:00    














- Physical Exam


Vitals and I&O: 


 Vital Signs











Temp  97.1 F   19 11:31


 


Pulse  81   19 13:06


 


Resp  19   19 12:00


 


BP  121/78   19 13:06


 


Pulse Ox  97   19 11:31








 Intake & Output











 19





 18:59 06:59 18:59


 


Intake Total 1347.500 200 


 


Output Total 500 490 


 


Balance 847.500 -290 


 


Weight (lbs) 63.957 kg 81.873 kg 


 


Intake:   


 


  Intake, IV Amount 1047.500 200 


 


    D5-0.9%Ns 1,000 ml @ 40 923.333  





    mls/hr IV .Q24H [facility] Rx#:   





    [id number]   


 


    Piperacillin Sodium/ 124.167 200 





    Tazobact 2.25 gm In   





    Sodium Chloride 0.9% 100   





    ml @ 100 mls/hr IV Q8HR   





    [facility] Rx#:[id number]   


 


  Oral 300  


 


Output:   


 


  Urine 500 490 


 


Other:   


 


  Weight Source Bedscale Bedscale 











Active Medications: 


Current Medications





Acetaminophen (Tylenol 650mg Supp)  650 mg RC Q4HR PRN


   PRN Reason: MILD PAIN OR TEMP >100


   Stop: 19 23:21


   Last Admin: 19 20:51 Dose:  650 mg


Acetaminophen/Hydrocodone Bitart (Norco 5mg/325mg)  1 tab PO Q4H PRN


   PRN Reason: MODERATE PAIN (LEVEL 4-6)


   Stop: 19 23:21


   Last Admin: 19 22:31 Dose:  1 tab


Albuterol Sulfate (Albuterol 2.5mg/3ml Neb Ud)  2.5 mg HHN Q2HRT PRN


   PRN Reason: Shortness of Breath or Wheeze


   Stop: 19 23:25


Baclofen (Lioresal)  10 mg PO QID [facility]


   Stop: 19 08:59


   Last Admin: 19 13:06 Dose:  10 mg


Famotidine (Pepcid)  20 mg PO DAILY@0730 [facility]


   Stop: 19 07:29


   Last Admin: 19 06:44 Dose:  20 mg


Heparin Sodium (Porcine) (Heparin)  5,000 units SUBQ Q12HR [facility]


   Stop: 19 08:59


   Last Admin: 19 09:13 Dose:  5,000 units


Piperacillin Sod/Tazobactam (Sod 2.25 gm/ Sodium Chloride)  100 mls @ 100 mls/

hr IV Q8HR [facility]


   Stop: 19 04:59


   Last Admin: 19 12:58 Dose:  100 mls/hr


Dextrose/Sodium Chloride (D5-0.45ns)  1,000 mls @ 40 mls/hr IV .Q24H [facility]


   Stop: 19 16:29


   Last Admin: 19 16:29 Dose:  40 mls/hr


Insulin Aspart (Novolog)  0 units SUBQ [facility]; Protocol


   Stop: 19 07:29


   Last Admin: 19 12:35 Dose:  Not Given


Lactobacillus Rhamnosus (Culturelle 15b)  1 each PO DAILY [facility]


   Stop: 19 15:59


   Last Admin: 19 09:12 Dose:  1 each


Magnesium Hydroxide (Milk Of Magnesia)  30 ml PO Q72H PRN


   PRN Reason: Constipation


   Stop: 19 23:21


Metoprolol Tartrate (Lopressor)  50 mg PO TID [facility]


   Stop: 19 08:59


   Last Admin: 19 13:06 Dose:  50 mg


Mineral Oil (Mineral Oil 30 Ml)  30 ml PO BID [facility]


   Stop: 19 16:59


   Last Admin: 19 09:13 Dose:  30 ml


Miscellaneous (Probiotic Screen)  1 ea  PRN PRN


   PRN Reason: PROTOCOL


   Stop: 19 14:43


Miscellaneous (Amikacin Iv Per Pharmacy)  1 ea  PRN PRN


   PRN Reason: PROTOCOL


   Stop: 19 15:54


Ondansetron HCl (Zofran)  4 mg IV Q8H PRN


   PRN Reason: Nausea / Vomiting


   Stop: 19 23:26


Sodium Bicarbonate (Sodium Bicarbonate)  650 mg PO BID [facility]; Protocol


   Stop: 19 16:59


   Last Admin: 19 09:12 Dose:  650 mg


Sodium Phosphate (Fleet Enema)  135 ml RC PRN PRN


   PRN Reason: IF DULCOLAX INEFFECTIVE


   Stop: 19 23:21


   Last Admin: 19 05:05 Dose:  135 ml








General: Alert, No acute distress


HEENT: Atraumatic, Mucous membr. moist/pink


Neck: Supple, +2 carotid pulse wo bruit


Cardiovascular: Regular rate, Normal S1, Normal S2


Lungs: Clear to auscultation


Abdomen: Bowel sounds, Soft


Extremities: no Edema


Neurological: Sensation intact


Skin: no Rash


Psych/Mental Status: Mood NL





- Procedures


Procedures: 


 Procedures











Procedure Code Date


 


INSERT INDWELLING CATH 57.94 10/16/08


 


INSERT TEMP BLADDER CATH [id number] 10/16/08














Assessment/Plan





- Problem List


Patient Problems: 


All Active Problems





VOMITING (Acute) 











- Assessment


Assessment: 





CKD


Chronic B/L renal/Ureteral Calculi


B/L [place]


Ileus


Cholelithiasis


P. analilia Cx UTI


P aeruginosa bacteremia


Hypernatremia








- Plan


Plan: 


Lab - Result Diagrams





 19 04:30 





 19 04:30 





Current Medications





Acetaminophen (Tylenol 650mg Supp)  650 mg RC Q4HR PRN


   PRN Reason: MILD PAIN OR TEMP >100


   Stop: 19 23:21


   Last Admin: 19 20:51 Dose:  650 mg


Acetaminophen/Hydrocodone Bitart (Norco 5mg/325mg)  1 tab PO Q4H PRN


   PRN Reason: MODERATE PAIN (LEVEL 4-6)


   Stop: 19 23:21


   Last Admin: 19 22:31 Dose:  1 tab


Albuterol Sulfate (Albuterol 2.5mg/3ml Neb Ud)  2.5 mg HHN Q2HRT PRN


   PRN Reason: Shortness of Breath or Wheeze


   Stop: 19 23:25


Baclofen (Lioresal)  10 mg PO QID [facility]


   Stop: 19 08:59


   Last Admin: 19 13:06 Dose:  10 mg


Famotidine (Pepcid)  20 mg PO DAILY@0730 [facility]


   Stop: 19 07:29


   Last Admin: 19 06:44 Dose:  20 mg


Heparin Sodium (Porcine) (Heparin)  5,000 units SUBQ Q12HR [facility]


   Stop: 19 08:59


   Last Admin: 19 09:13 Dose:  5,000 units


Piperacillin Sod/Tazobactam (Sod 2.25 gm/ Sodium Chloride)  100 mls @ 100 mls/

hr IV Q8HR [facility]


   Stop: 19 04:59


   Last Admin: 19 12:58 Dose:  100 mls/hr


Dextrose/Sodium Chloride (D5-0.45ns)  1,000 mls @ 40 mls/hr IV .Q24H [facility]


   Stop: 19 16:29


   Last Admin: 19 16:29 Dose:  40 mls/hr


Insulin Aspart (Novolog)  0 units SUBQ [facility]; Protocol


   Stop: 19 07:29


   Last Admin: 19 12:35 Dose:  Not Given


Lactobacillus Rhamnosus (Culturelle 15b)  1 each PO DAILY [facility]


   Stop: 19 15:59


   Last Admin: 19 09:12 Dose:  1 each


Magnesium Hydroxide (Milk Of Magnesia)  30 ml PO Q72H PRN


   PRN Reason: Constipation


   Stop: 19 23:21


Metoprolol Tartrate (Lopressor)  50 mg PO TID [facility]


   Stop: 19 08:59


   Last Admin: 19 13:06 Dose:  50 mg


Mineral Oil (Mineral Oil 30 Ml)  30 ml PO BID [facility]


   Stop: 19 16:59


   Last Admin: 19 09:13 Dose:  30 ml


Miscellaneous (Probiotic Screen)  1 ea  PRN PRN


   PRN Reason: PROTOCOL


   Stop: 19 14:43


Miscellaneous (Amikacin Iv Per Pharmacy)  1 ea  PRN PRN


   PRN Reason: PROTOCOL


   Stop: 19 15:54


Ondansetron HCl (Zofran)  4 mg IV Q8H PRN


   PRN Reason: Nausea / Vomiting


   Stop: 19 23:26


Sodium Bicarbonate (Sodium Bicarbonate)  650 mg PO BID [facility]; Protocol


   Stop: 19 16:59


   Last Admin: 19 09:12 Dose:  650 mg


Sodium Phosphate (Fleet Enema)  135 ml RC PRN PRN


   PRN Reason: IF DULCOLAX INEFFECTIVE


   Stop: 19 23:21


   Last Admin: 19 05:05 Dose:  135 ml





Lab - Result Diagrams





 19 04:30 





 19 04:30 





Kidney fnc basically the same


Mother does not want any interventions


DC plan





Nutritional Asmnt/Malnutr-PDOC





- Dietary Evaluation


Malnutrition Findings (Please click <Entered> for more info): 








Nutritional Asmnt/Malnutrition                             Start:  19 16:

19


Text:                                                      Status: Complete    

  


Freq:                                                                          

  


Protocol:                                                                      

  


 Document     19 16:20  LIVE  (Rec: 19 16:36  LIVE [id number])


 Nutritional Asmnt/Malnutrition


     Patient General Information


      Nutritional Screening                      High Risk


                                                 Consult


      Diagnosis                                  sepsis, UTI


      Pertinent Medical Hx/Surgical Hx           HTN, CVA/TIA, dyslipdiemia,


                                                 PUD/GERD, renal stone,


                                                 dementia, CP, osteoporosis


      Subjective Information                     Consult received for theresa Stewart


                                                 . Pt seen lying in bed at time


                                                 of visit, awake, non-verbal


                                                 noted. Spoke with CONCEPCION Rodriguez RN


                                                 reported pt ate well,


                                                 consumed 100% of lunch. Pt has


                                                 multiple renal stone noted


                                                 per nurse, urology doctor on


                                                 case.


      Current Diet Order/ Nutrition Support      JAMIE, vegerarian pureed, Two


                                                 Isai HN 1 can if PO<80%


      Pertinent Medications                      D5-0.9%ns, pepcid, novolog,


                                                 heparin, piperacillin, zofran


      Pertinent Labs                              BUN 65, Cr 5.0, gluocse


                                                 150, -222


                                                  Na 135, BUN 61, Cr 5.1,


                                                 Gluocse 145, alb 3.2


     Nutritional Hx/Data


      Height                                     1.6 m


      Height (Calculated Centimeters)            160.0


      Current Weight (lbs)                       61.235 kg


      Weight (Calculated Kilograms)              61.2


      Weight (Calculated Grams)                  16110.0


      Ideal Body Weight                          124


      Body Mass Index (BMI)                      23.9


      Weight Status                              Approriate


     GI Symptoms


      GI Symptoms                                None


      Last BM                                    not indicated


      Difficult in:                              None


      Skin Integrity/Comment:                    reddened to right and left


                                                 buttocks


      Current %PO                                Good (%)


     Estimated Nutritional Goals


      BEE in Kcals:                              Using Current wt


      Calories/Kcals/Kg                          27-32


      Kcals Calculated                           8870-7759


      Protein:                                   Using Current wt


      Protein g/k.8


      Protein Calculated                         49


      Fluid: ml                                  per MD


     Nutritional Problem


      1. Problem


       Problem                                   altered nutrition related labs


       Etiology                                  renal dysfunction,


                                                 hyperglycemia


       Signs/Symptoms:                           BUN 65, Cr 5.0, gluocse 150,


                                                 -222


     Intervention/Recommendation


      Comments                                   1. Continue with vegetarian


                                                 pureed JAMIE diet with


                                                 supplemental of Two Isai HN as


                                                 ordered.


                                                 2. Will continue to monitor


                                                 renal labs and consider adjust


                                                 protein intake, however pt is


                                                 on vegetarian diet which not


                                                 providing a large mount of


                                                 protien at this time.


                                                 3. Monitor PO intake, wt, labs


                                                 and skin integrity


                                                 4. F/U as high risk in 2-3


                                                 days


     Expected Outcomes/Goals


      Expected Outcomes/Goals                    1. PO intake to meet at least


                                                 75% of nutritional needs.


                                                 2. Wt stability, skin to


                                                 remain intact, labs to


                                                 approach WNL.

## 2019-01-22 NOTE — PROGRESS NOTES
DATE:  



UROLOGY PROGRESS NOTE



SUBJECTIVE:  The patient has some hematuria, but no other complaints.  He is

tolerating diet.



OBJECTIVE:

VITAL SIGNS:  On exam, temperature 97.1, heart rate 91, blood pressure 121/78.

ABDOMEN:  Soft and mildly distended, but no guarding or rigidity.  Bladder not

palpable.

EXTREMITIES:  No edema but contracted.



LABORATORY AND DIAGNOSTIC DATA:  White count 25.6, significant rise; hemoglobin

9.2, significant drop; and platelets 246.  Sodium 150, CO2 17.9, BUN 78 and

creatinine 6.3 up from 5.6 and 5.3 gradually rising but significantly.  Glucose

185, 130 and 132.  Urine is growing proteus and blood is growing pseudomonas.



IMPRESSION:

1.  Bilateral kidney stones and bilateral ureteral stones with bilateral

hydronephrosis and worsening renal failure.

2.  Sepsis, elevated white count and urinary tract infection with positive blood

cultures as well.

3.  History of cerebral palsy, no change.



PLAN:  I had a long discussion with the patient's mother who is the guardian and

who has the power of  for this patient.  I described to her the

treatment options of bilateral nephrostomies followed by multiple surgeries to

clean out the kidneys of stones versus dialysis with possibility of surgeries to

treat the obstructed kidneys with stone surgery or nephrectomies versus comfort

care and hospice care to make the patient DNR status.  She immediately indicated

that she wants him to be DNR and to go in peace as he has suffered enough for

the last 49 years.  She was going to discuss this with other consultants to

convey her feelings very strongly and to avoid any nephrostomies, dialysis or

surgeries.  I concur and will pass this message to his caretakers.





DD: 01/21/2019 17:30

DT: 01/22/2019 04:49

JOB# [id number]  [id number]

## 2022-08-09 NOTE — HISTORY & PHYSICAL
CHIEF COMPLAINT:  Abdominal pain.



HISTORY OF PRESENT ILLNESS:  This is a 46-year-old male who is a resident of

[facility] who is brought here to [facility] for

1-day history of a temperature of 102.5.  According to the medical records, the

patient was just recently discharged from [facility] where he had

extracorporeal shockwave lithotripsy and urological surgery with the right flank

incision.  For this reason, the patient is now admitted to the telemetry unit.



PAST MEDICAL HISTORY:  Hypertension, cerebral palsy, osteoporosis,

nephrolithiasis, mitral valve regurgitation, recurrent UTI, gastritis, and

seizures.



SOCIAL HISTORY:  The patient resides at [facility], requiring 24-hour

nursing care.



SURGICAL HISTORY:  The patient has recently had extracorporeal shockwave

lithotripsy and urological surgery with right flank incision.



REVIEW OF SYSTEMS:  Unable to obtain due to the patient's mental status.



PHYSICAL EXAMINATION:

GENERAL:  The patient is well developed, well nourished, no acute distress.

VITAL SIGNS:  Temperature 99.4, heart rate 113, blood pressure 130/74,

respirations 18.

HEENT:  Head:  Normocephalic, atraumatic.

NECK:  Supple.  No mass.

LUNGS:  Few rhonchi bilaterally upon auscultation.

CARDIOVASCULAR:  Regular rate and rhythm.  No murmurs or gallops.

SKIN:  Intact, warm and dry to touch.

ABDOMEN:  Soft, nontender, nondistended.  Positive bowel sounds in all 4

quadrants.



LABORATORY DATA:  WBC 12.9, H and H 11.6 and 34.8.  Sodium 136, potassium 3.4,

chloride 104, carbon dioxide 22.2, BUN of 18, creatinine 1.5.



The patient had a urinalysis done and it was positive for UTI.



ASSESSMENT:  Fever, sepsis.



PLAN:  The patient to be admitted to the med/surg unit.  The patient will be on

IV fluids for hydration.  We will monitor the patient's CBC and BMP.  The

patient will be kept on IV antibiotics of Zosyn.  We will continue to monitor

the patient.





DD: 01/13/2017 13:01

DT: 01/13/2017 17:51

JOB# [id number]  [id number] Observe.